# Patient Record
Sex: MALE | Race: OTHER | HISPANIC OR LATINO | URBAN - METROPOLITAN AREA
[De-identification: names, ages, dates, MRNs, and addresses within clinical notes are randomized per-mention and may not be internally consistent; named-entity substitution may affect disease eponyms.]

---

## 2023-04-19 ENCOUNTER — INPATIENT (INPATIENT)
Facility: HOSPITAL | Age: 33
LOS: 3 days | Discharge: HOME | DRG: 373 | End: 2023-04-23
Attending: COLON & RECTAL SURGERY | Admitting: COLON & RECTAL SURGERY
Payer: COMMERCIAL

## 2023-04-19 VITALS
OXYGEN SATURATION: 98 % | TEMPERATURE: 99 F | WEIGHT: 199.96 LBS | HEART RATE: 106 BPM | HEIGHT: 68 IN | DIASTOLIC BLOOD PRESSURE: 81 MMHG | RESPIRATION RATE: 18 BRPM | SYSTOLIC BLOOD PRESSURE: 126 MMHG

## 2023-04-19 LAB
ALBUMIN SERPL ELPH-MCNC: 4.2 G/DL — SIGNIFICANT CHANGE UP (ref 3.3–5)
ALP SERPL-CCNC: 84 U/L — SIGNIFICANT CHANGE UP (ref 40–120)
ALT FLD-CCNC: 129 U/L — HIGH (ref 10–45)
ANION GAP SERPL CALC-SCNC: 12 MMOL/L — SIGNIFICANT CHANGE UP (ref 5–17)
APPEARANCE UR: CLEAR — SIGNIFICANT CHANGE UP
APTT BLD: 36.6 SEC — HIGH (ref 27.5–35.5)
AST SERPL-CCNC: 35 U/L — SIGNIFICANT CHANGE UP (ref 10–40)
BILIRUB SERPL-MCNC: 1.3 MG/DL — HIGH (ref 0.2–1.2)
BILIRUB UR-MCNC: NEGATIVE — SIGNIFICANT CHANGE UP
BLD GP AB SCN SERPL QL: NEGATIVE — SIGNIFICANT CHANGE UP
BUN SERPL-MCNC: 20 MG/DL — SIGNIFICANT CHANGE UP (ref 7–23)
CALCIUM SERPL-MCNC: 9.9 MG/DL — SIGNIFICANT CHANGE UP (ref 8.4–10.5)
CHLORIDE SERPL-SCNC: 100 MMOL/L — SIGNIFICANT CHANGE UP (ref 96–108)
CO2 SERPL-SCNC: 26 MMOL/L — SIGNIFICANT CHANGE UP (ref 22–31)
COLOR SPEC: YELLOW — SIGNIFICANT CHANGE UP
CREAT SERPL-MCNC: 0.99 MG/DL — SIGNIFICANT CHANGE UP (ref 0.5–1.3)
DIFF PNL FLD: NEGATIVE — SIGNIFICANT CHANGE UP
EGFR: 103 ML/MIN/1.73M2 — SIGNIFICANT CHANGE UP
GLUCOSE SERPL-MCNC: 93 MG/DL — SIGNIFICANT CHANGE UP (ref 70–99)
GLUCOSE UR QL: NEGATIVE — SIGNIFICANT CHANGE UP
HCT VFR BLD CALC: 43.3 % — SIGNIFICANT CHANGE UP (ref 39–50)
HGB BLD-MCNC: 15 G/DL — SIGNIFICANT CHANGE UP (ref 13–17)
INR BLD: 1.13 — SIGNIFICANT CHANGE UP (ref 0.88–1.16)
KETONES UR-MCNC: NEGATIVE — SIGNIFICANT CHANGE UP
LEUKOCYTE ESTERASE UR-ACNC: NEGATIVE — SIGNIFICANT CHANGE UP
MCHC RBC-ENTMCNC: 30.5 PG — SIGNIFICANT CHANGE UP (ref 27–34)
MCHC RBC-ENTMCNC: 34.6 GM/DL — SIGNIFICANT CHANGE UP (ref 32–36)
MCV RBC AUTO: 88.2 FL — SIGNIFICANT CHANGE UP (ref 80–100)
NITRITE UR-MCNC: NEGATIVE — SIGNIFICANT CHANGE UP
NRBC # BLD: 0 /100 WBCS — SIGNIFICANT CHANGE UP (ref 0–0)
PH UR: 6.5 — SIGNIFICANT CHANGE UP (ref 5–8)
PLATELET # BLD AUTO: 365 K/UL — SIGNIFICANT CHANGE UP (ref 150–400)
POTASSIUM SERPL-MCNC: 4.2 MMOL/L — SIGNIFICANT CHANGE UP (ref 3.5–5.3)
POTASSIUM SERPL-SCNC: 4.2 MMOL/L — SIGNIFICANT CHANGE UP (ref 3.5–5.3)
PROT SERPL-MCNC: 7.5 G/DL — SIGNIFICANT CHANGE UP (ref 6–8.3)
PROT UR-MCNC: NEGATIVE MG/DL — SIGNIFICANT CHANGE UP
PROTHROM AB SERPL-ACNC: 13.5 SEC — HIGH (ref 10.5–13.4)
RBC # BLD: 4.91 M/UL — SIGNIFICANT CHANGE UP (ref 4.2–5.8)
RBC # FLD: 12.5 % — SIGNIFICANT CHANGE UP (ref 10.3–14.5)
RH IG SCN BLD-IMP: POSITIVE — SIGNIFICANT CHANGE UP
SODIUM SERPL-SCNC: 138 MMOL/L — SIGNIFICANT CHANGE UP (ref 135–145)
SP GR SPEC: 1.02 — SIGNIFICANT CHANGE UP (ref 1–1.03)
UROBILINOGEN FLD QL: 1 E.U./DL — SIGNIFICANT CHANGE UP
WBC # BLD: 9.97 K/UL — SIGNIFICANT CHANGE UP (ref 3.8–10.5)
WBC # FLD AUTO: 9.97 K/UL — SIGNIFICANT CHANGE UP (ref 3.8–10.5)

## 2023-04-19 PROCEDURE — 74177 CT ABD & PELVIS W/CONTRAST: CPT | Mod: 26,MG

## 2023-04-19 PROCEDURE — G1004: CPT

## 2023-04-19 PROCEDURE — 99285 EMERGENCY DEPT VISIT HI MDM: CPT

## 2023-04-19 RX ORDER — SODIUM CHLORIDE 9 MG/ML
1000 INJECTION INTRAMUSCULAR; INTRAVENOUS; SUBCUTANEOUS ONCE
Refills: 0 | Status: COMPLETED | OUTPATIENT
Start: 2023-04-19 | End: 2023-04-19

## 2023-04-19 RX ORDER — CEFTRIAXONE 500 MG/1
1000 INJECTION, POWDER, FOR SOLUTION INTRAMUSCULAR; INTRAVENOUS EVERY 24 HOURS
Refills: 0 | Status: DISCONTINUED | OUTPATIENT
Start: 2023-04-19 | End: 2023-04-23

## 2023-04-19 RX ORDER — METRONIDAZOLE 500 MG
500 TABLET ORAL EVERY 8 HOURS
Refills: 0 | Status: DISCONTINUED | OUTPATIENT
Start: 2023-04-20 | End: 2023-04-23

## 2023-04-19 RX ORDER — MORPHINE SULFATE 50 MG/1
4 CAPSULE, EXTENDED RELEASE ORAL ONCE
Refills: 0 | Status: DISCONTINUED | OUTPATIENT
Start: 2023-04-19 | End: 2023-04-19

## 2023-04-19 RX ORDER — CEFTRIAXONE 500 MG/1
1000 INJECTION, POWDER, FOR SOLUTION INTRAMUSCULAR; INTRAVENOUS ONCE
Refills: 0 | Status: COMPLETED | OUTPATIENT
Start: 2023-04-19 | End: 2023-04-19

## 2023-04-19 RX ORDER — HYDROMORPHONE HYDROCHLORIDE 2 MG/ML
0.25 INJECTION INTRAMUSCULAR; INTRAVENOUS; SUBCUTANEOUS ONCE
Refills: 0 | Status: DISCONTINUED | OUTPATIENT
Start: 2023-04-19 | End: 2023-04-20

## 2023-04-19 RX ORDER — SODIUM CHLORIDE 9 MG/ML
1000 INJECTION, SOLUTION INTRAVENOUS
Refills: 0 | Status: DISCONTINUED | OUTPATIENT
Start: 2023-04-19 | End: 2023-04-22

## 2023-04-19 RX ORDER — DIATRIZOATE MEGLUMINE 180 MG/ML
30 INJECTION, SOLUTION INTRAVESICAL ONCE
Refills: 0 | Status: COMPLETED | OUTPATIENT
Start: 2023-04-19 | End: 2023-04-19

## 2023-04-19 RX ORDER — METRONIDAZOLE 500 MG
500 TABLET ORAL ONCE
Refills: 0 | Status: COMPLETED | OUTPATIENT
Start: 2023-04-19 | End: 2023-04-19

## 2023-04-19 RX ORDER — ACETAMINOPHEN 500 MG
1000 TABLET ORAL ONCE
Refills: 0 | Status: DISCONTINUED | OUTPATIENT
Start: 2023-04-19 | End: 2023-04-20

## 2023-04-19 RX ADMIN — CEFTRIAXONE 1000 MILLIGRAM(S): 500 INJECTION, POWDER, FOR SOLUTION INTRAMUSCULAR; INTRAVENOUS at 23:12

## 2023-04-19 RX ADMIN — CEFTRIAXONE 100 MILLIGRAM(S): 500 INJECTION, POWDER, FOR SOLUTION INTRAMUSCULAR; INTRAVENOUS at 22:25

## 2023-04-19 RX ADMIN — MORPHINE SULFATE 4 MILLIGRAM(S): 50 CAPSULE, EXTENDED RELEASE ORAL at 21:32

## 2023-04-19 RX ADMIN — MORPHINE SULFATE 4 MILLIGRAM(S): 50 CAPSULE, EXTENDED RELEASE ORAL at 22:27

## 2023-04-19 RX ADMIN — DIATRIZOATE MEGLUMINE 30 MILLILITER(S): 180 INJECTION, SOLUTION INTRAVESICAL at 18:46

## 2023-04-19 RX ADMIN — SODIUM CHLORIDE 1000 MILLILITER(S): 9 INJECTION INTRAMUSCULAR; INTRAVENOUS; SUBCUTANEOUS at 18:46

## 2023-04-19 RX ADMIN — Medication 100 MILLIGRAM(S): at 22:25

## 2023-04-19 NOTE — ED ADULT NURSE NOTE - OBJECTIVE STATEMENT
Presents for c/o RLQ pain with n+v with yellow vomitus x 1 day, advised to come to ED by PCP for likely pre-op. Denies CP/SOB/weakness/dizziness/tingling/cough/fevers/known sick contacts.    On assessment- AOx4, breathing even and unlabored on RA, no apparent distress at rest, VSS in triage, able to speak in clear coherent sentences, steady gait unassisted, neuro intact with no apparent facial asymmetry, PERRLA. Tenderness RLQ to palpation.

## 2023-04-19 NOTE — H&P ADULT - NSHPLABSRESULTS_GEN_ALL_CORE
LABS:                        15.0   9.97  )-----------( 365      ( 19 Apr 2023 19:04 )             43.3     04-19    138  |  100  |  20  ----------------------------<  93  4.2   |  26  |  0.99    Ca    9.9      19 Apr 2023 19:04    TPro  7.5  /  Alb  4.2  /  TBili  1.3<H>  /  DBili  x   /  AST  35  /  ALT  129<H>  /  AlkPhos  84  04-19    PT/INR - ( 19 Apr 2023 19:04 )   PT: 13.5 sec;   INR: 1.13          PTT - ( 19 Apr 2023 19:04 )  PTT:36.6 sec  LIVER FUNCTIONS - ( 19 Apr 2023 19:04 )  Alb: 4.2 g/dL / Pro: 7.5 g/dL / ALK PHOS: 84 U/L / ALT: 129 U/L / AST: 35 U/L / GGT: x             RADIOLOGY & ADDITIONAL STUDIES:  CT Abdomen and Pelvis w/ Oral Cont and w/ IV Cont:   ******PRELIMINARY REPORT******    ******PRELIMINARY REPORT******       ACC: [id number] EXAM:  CT ABDOMEN AND PELVIS OC IC   ORDERED BY: PHI YUEN     PROCEDURE DATE:  04/19/2023    ******PRELIMINARY REPORT******    ******PRELIMINARY REPORT******       INTERPRETATION:  [facility] RADIOLOGIST PRELIMINARY REPORT      Initial report created on 4/19/2023 10:01:35 PM EDT  PROCEDURE INFORMATION:  Exam: CT Abdomen And Pelvis With Contrast  Exam date and time: 4/19/2023 8:40 PM  Age: 32 years old  Clinical indication: Rlq pain, concerned for appendicitis    TECHNIQUE:  Imaging protocol: Computed tomography of the abdomen and pelvis with   contrast.  Contrast material: IV: ISOVUE 370; ORAL: OMNIPAQUE 300; Contrast volume:   90 ml;  Contrast route: IV;    COMPARISON:  No relevant prior studies available.    FINDINGS:  Lungs: Lung bases are clear.  Liver: Normal liver.  Gallbladder and bile ducts: Normal gallbladder. No calcified stones. No ductal dilation.  Pancreas: Normal pancreas. No ductal dilation.  Spleen: Normal spleen. No splenomegaly.  Adrenal glands: Adrenal glands are normal.  Kidneys and ureters: Normal kidneys. No hydronephrosis. No calculus.  Stomach and bowel: Remaining bowel loops appear unremarkable.  Appendix: Normal appendix is not seen. A 6 x 6 x 6 cm abscess abutting the cecum.  Intraperitoneal space: Small amount of free fluid in the pelvis. No free air.  Vasculature: Unremarkable. No abdominal aortic aneurysm.  Lymph nodes: No adenopathy.  Urinary bladder: Normal urinary bladder.  Reproductive: Normal prostate gland.  Bones/joints: Unremarkable. No acute fracture.  Soft tissues: Unremarkable.    IMPRESSION:  1.   A 6 x 6 x 6 cm abscess abutting the cecum, with normal appendix not seen. Suspect a perforated appendicitis.  2.   Small amount of free fluid in the pelvis.      ******PRELIMINARY REPORT******    ******PRELIMINARY REPORT******       GUSTABO ACOSTA M.D.;[facility] RADIOLOGIST  This document is a PRELIMINARY interpretation and is pending final   attending approval. Apr 19 2023 10:02PM (04-19-23 @ 20:46)

## 2023-04-19 NOTE — ED PROVIDER NOTE - PHYSICAL EXAMINATION
VITAL SIGNS: I have reviewed nursing notes and confirm.  CONSTITUTIONAL: Well-developed; well-nourished; in no acute distress.   SKIN:  warm and dry, no acute rash.   HEAD:  normocephalic, atraumatic.  EYES: PERRL, EOM intact; conjunctiva and sclera clear.  ENT: No nasal discharge; airway clear.   NECK: Supple; non tender.  CARD: S1, S2 normal; no murmurs, gallops, or rubs. Regular rate and rhythm.   RESP:  Clear to auscultation b/l, no wheezes, rales or rhonchi.  ABD: Normal bowel sounds; soft; non-distended; RLQ tenderness; no guarding/ rebound.  EXT: Normal ROM. No clubbing, cyanosis or edema. 2+ pulses to b/l ue/le.  NEURO: Alert, oriented, grossly unremarkable  PSYCH: Cooperative, mood and affect appropriate.

## 2023-04-19 NOTE — H&P ADULT - NSHPPHYSICALEXAM_GEN_ALL_CORE
T(C): 36.8 (04-19-23 @ 21:32), Max: 37.4 (04-19-23 @ 17:06)  HR: 93 (04-19-23 @ 21:32) (93 - 106)  BP: 119/78 (04-19-23 @ 21:32) (119/78 - 126/81)  RR: 17 (04-19-23 @ 21:32) (17 - 18)  SpO2: 98% (04-19-23 @ 21:32) (98% - 98%)    Physical Exam  General: NAD, laying comfortably in bed  Cardio: S1,S2, No MRG, RRR  Pulm: No respiratory distress, breathing comfortably on room air  Abdomen: soft, mild RLQ TTP, nondistended  Extremities: WWP, peripheral pulses appreciated  Neuro: CNII-XII grossly intact, no gross motor deficits  Psych: AAOx3, pleasant

## 2023-04-19 NOTE — ED PROVIDER NOTE - NS ED ATTENDING STATEMENT MOD
This was a shared visit with the LAQUITA. I reviewed and verified the documentation and independently performed the documented:

## 2023-04-19 NOTE — ED PROVIDER NOTE - CLINICAL SUMMARY MEDICAL DECISION MAKING FREE TEXT BOX
Pt afebrile and hemodynamically stable. Sent today due to concern for perforated appendicitis. Given 1L NS, initially declined pain medication. CBC, CMP unremarkable. On reevaluation, pain has increased so given morphine 4mg IV. CTAP notable for s 6 x 6 x 6 cm abscess abutting the cecum, with normal appendix not seen. Suspect a perforated appendicitis. Small amount of free fluid in the pelvis. Surgery notified and will admit pt to Dr. Flores for further management. Pt ordered for ceftriaxone and flagyl. Last po intake 1pm today.

## 2023-04-19 NOTE — H&P ADULT - HISTORY OF PRESENT ILLNESS
HPI: 32M w/ no PMHx or PSHx presents for 3 days of RLQ abd pain. Pt states his pain began after lunch 3 days ago that he describes as sharp pain, worse with movement. He states his pain has not been resolving and he went to Dr. Flores's office today for evaluation and was sent in to the ED. Pt states he has been having a lot of burping over this time w/ decreased flatus. Endorses small yellow diarrhea. Minimal PO intake. Denies n/v. Denies f/c.    PAST MEDICAL & SURGICAL HISTORY:  None      In the ED, pt was afebrile, mildly tachycardic to 100s, normotensive, and satting well on RA. Labs significant for WBC . Hgb .    PMHx: Denies  PSHx: Denies  Social Hx: Social EtOH  Family Hx: Denies family hx of IBS, Crohn's or colon cancer. Mother has UC.  Last Cscope: Denies  Last EGD: Denies  All: No Known Allergies  Meds: [facility]    MEDICATIONS  (STANDING):  cefTRIAXone   IVPB 1000 milliGRAM(s) IV Intermittent every 24 hours  lactated ringers. 1000 milliLiter(s) (130 mL/Hr) IV Continuous <Continuous>    MEDICATIONS  (PRN):  acetaminophen   IVPB .. 1000 milliGRAM(s) IV Intermittent once PRN Temp greater or equal to 38C (100.4F), Mild Pain (1 - 3)  HYDROmorphone  Injectable 0.25 milliGRAM(s) IV Push once PRN Moderate Pain (4 - 6)

## 2023-04-19 NOTE — H&P ADULT - ASSESSMENT
32M w/ no PMHx or PSHx is admitted for perforated appendicitis.       PLAN  - Admit to [facility] in [facility]  - Pain/antiemetic management PRN  - NPO with maintenance IVFs  - DVT prophylaxis (SCDs only; HOLD SQH)  - Consult IR in AM for drainage   - Continue antibiotics (Ceftriaxone and Flagyl)  - Please page Surgery Team 2 at [phone] with any questions and/or clinical changes

## 2023-04-19 NOTE — ED ADULT NURSE NOTE - CAS TRG GEN SKIN CONDITION
Chart reviewed for care gaps. Patient is not been seen in office since 2019. [employer] message sent to schedule a Medicare wellness exam and fasting labs with PCP.
Warm

## 2023-04-20 LAB
ALBUMIN SERPL ELPH-MCNC: 3.9 G/DL — SIGNIFICANT CHANGE UP (ref 3.3–5)
ALP SERPL-CCNC: 91 U/L — SIGNIFICANT CHANGE UP (ref 40–120)
ALT FLD-CCNC: 86 U/L — HIGH (ref 10–45)
ANION GAP SERPL CALC-SCNC: 10 MMOL/L — SIGNIFICANT CHANGE UP (ref 5–17)
ANION GAP SERPL CALC-SCNC: 12 MMOL/L — SIGNIFICANT CHANGE UP (ref 5–17)
APTT BLD: 29.5 SEC — SIGNIFICANT CHANGE UP (ref 27.5–35.5)
APTT BLD: 33.2 SEC — SIGNIFICANT CHANGE UP (ref 27.5–35.5)
AST SERPL-CCNC: 26 U/L — SIGNIFICANT CHANGE UP (ref 10–40)
BILIRUB SERPL-MCNC: 2.4 MG/DL — HIGH (ref 0.2–1.2)
BLD GP AB SCN SERPL QL: NEGATIVE — SIGNIFICANT CHANGE UP
BUN SERPL-MCNC: 12 MG/DL — SIGNIFICANT CHANGE UP (ref 7–23)
BUN SERPL-MCNC: 14 MG/DL — SIGNIFICANT CHANGE UP (ref 7–23)
CALCIUM SERPL-MCNC: 9.4 MG/DL — SIGNIFICANT CHANGE UP (ref 8.4–10.5)
CALCIUM SERPL-MCNC: 9.8 MG/DL — SIGNIFICANT CHANGE UP (ref 8.4–10.5)
CHLORIDE SERPL-SCNC: 100 MMOL/L — SIGNIFICANT CHANGE UP (ref 96–108)
CHLORIDE SERPL-SCNC: 104 MMOL/L — SIGNIFICANT CHANGE UP (ref 96–108)
CO2 SERPL-SCNC: 24 MMOL/L — SIGNIFICANT CHANGE UP (ref 22–31)
CO2 SERPL-SCNC: 25 MMOL/L — SIGNIFICANT CHANGE UP (ref 22–31)
CREAT SERPL-MCNC: 0.81 MG/DL — SIGNIFICANT CHANGE UP (ref 0.5–1.3)
CREAT SERPL-MCNC: 0.85 MG/DL — SIGNIFICANT CHANGE UP (ref 0.5–1.3)
EGFR: 118 ML/MIN/1.73M2 — SIGNIFICANT CHANGE UP
EGFR: 119 ML/MIN/1.73M2 — SIGNIFICANT CHANGE UP
GLUCOSE BLDC GLUCOMTR-MCNC: 94 MG/DL — SIGNIFICANT CHANGE UP (ref 70–99)
GLUCOSE SERPL-MCNC: 102 MG/DL — HIGH (ref 70–99)
GLUCOSE SERPL-MCNC: 116 MG/DL — HIGH (ref 70–99)
GRAM STN FLD: SIGNIFICANT CHANGE UP
HCT VFR BLD CALC: 40 % — SIGNIFICANT CHANGE UP (ref 39–50)
HCT VFR BLD CALC: 42 % — SIGNIFICANT CHANGE UP (ref 39–50)
HGB BLD-MCNC: 13.7 G/DL — SIGNIFICANT CHANGE UP (ref 13–17)
HGB BLD-MCNC: 14.6 G/DL — SIGNIFICANT CHANGE UP (ref 13–17)
INR BLD: 1.2 — HIGH (ref 0.88–1.16)
INR BLD: 1.26 — HIGH (ref 0.88–1.16)
LACTATE SERPL-SCNC: 1.5 MMOL/L — SIGNIFICANT CHANGE UP (ref 0.5–2)
MAGNESIUM SERPL-MCNC: 2 MG/DL — SIGNIFICANT CHANGE UP (ref 1.6–2.6)
MAGNESIUM SERPL-MCNC: 2 MG/DL — SIGNIFICANT CHANGE UP (ref 1.6–2.6)
MCHC RBC-ENTMCNC: 30.4 PG — SIGNIFICANT CHANGE UP (ref 27–34)
MCHC RBC-ENTMCNC: 30.4 PG — SIGNIFICANT CHANGE UP (ref 27–34)
MCHC RBC-ENTMCNC: 34.3 GM/DL — SIGNIFICANT CHANGE UP (ref 32–36)
MCHC RBC-ENTMCNC: 34.8 GM/DL — SIGNIFICANT CHANGE UP (ref 32–36)
MCV RBC AUTO: 87.5 FL — SIGNIFICANT CHANGE UP (ref 80–100)
MCV RBC AUTO: 88.7 FL — SIGNIFICANT CHANGE UP (ref 80–100)
NRBC # BLD: 0 /100 WBCS — SIGNIFICANT CHANGE UP (ref 0–0)
NRBC # BLD: 0 /100 WBCS — SIGNIFICANT CHANGE UP (ref 0–0)
PHOSPHATE SERPL-MCNC: 3 MG/DL — SIGNIFICANT CHANGE UP (ref 2.5–4.5)
PHOSPHATE SERPL-MCNC: 3.6 MG/DL — SIGNIFICANT CHANGE UP (ref 2.5–4.5)
PLATELET # BLD AUTO: 323 K/UL — SIGNIFICANT CHANGE UP (ref 150–400)
PLATELET # BLD AUTO: 334 K/UL — SIGNIFICANT CHANGE UP (ref 150–400)
POTASSIUM SERPL-MCNC: 4.2 MMOL/L — SIGNIFICANT CHANGE UP (ref 3.5–5.3)
POTASSIUM SERPL-MCNC: 4.3 MMOL/L — SIGNIFICANT CHANGE UP (ref 3.5–5.3)
POTASSIUM SERPL-SCNC: 4.2 MMOL/L — SIGNIFICANT CHANGE UP (ref 3.5–5.3)
POTASSIUM SERPL-SCNC: 4.3 MMOL/L — SIGNIFICANT CHANGE UP (ref 3.5–5.3)
PROT SERPL-MCNC: 7.3 G/DL — SIGNIFICANT CHANGE UP (ref 6–8.3)
PROTHROM AB SERPL-ACNC: 14.3 SEC — HIGH (ref 10.5–13.4)
PROTHROM AB SERPL-ACNC: 15 SEC — HIGH (ref 10.5–13.4)
RBC # BLD: 4.51 M/UL — SIGNIFICANT CHANGE UP (ref 4.2–5.8)
RBC # BLD: 4.8 M/UL — SIGNIFICANT CHANGE UP (ref 4.2–5.8)
RBC # FLD: 12.2 % — SIGNIFICANT CHANGE UP (ref 10.3–14.5)
RBC # FLD: 12.5 % — SIGNIFICANT CHANGE UP (ref 10.3–14.5)
RH IG SCN BLD-IMP: POSITIVE — SIGNIFICANT CHANGE UP
SODIUM SERPL-SCNC: 137 MMOL/L — SIGNIFICANT CHANGE UP (ref 135–145)
SODIUM SERPL-SCNC: 138 MMOL/L — SIGNIFICANT CHANGE UP (ref 135–145)
SPECIMEN SOURCE: SIGNIFICANT CHANGE UP
WBC # BLD: 11.64 K/UL — HIGH (ref 3.8–10.5)
WBC # BLD: 9.41 K/UL — SIGNIFICANT CHANGE UP (ref 3.8–10.5)
WBC # FLD AUTO: 11.64 K/UL — HIGH (ref 3.8–10.5)
WBC # FLD AUTO: 9.41 K/UL — SIGNIFICANT CHANGE UP (ref 3.8–10.5)

## 2023-04-20 PROCEDURE — 49406 IMAGE CATH FLUID PERI/RETRO: CPT

## 2023-04-20 RX ORDER — ACETAMINOPHEN 500 MG
1000 TABLET ORAL ONCE
Refills: 0 | Status: COMPLETED | OUTPATIENT
Start: 2023-04-20 | End: 2023-04-20

## 2023-04-20 RX ORDER — HEPARIN SODIUM 5000 [USP'U]/ML
5000 INJECTION INTRAVENOUS; SUBCUTANEOUS EVERY 8 HOURS
Refills: 0 | Status: DISCONTINUED | OUTPATIENT
Start: 2023-04-20 | End: 2023-04-23

## 2023-04-20 RX ORDER — SODIUM CHLORIDE 9 MG/ML
1000 INJECTION, SOLUTION INTRAVENOUS ONCE
Refills: 0 | Status: COMPLETED | OUTPATIENT
Start: 2023-04-20 | End: 2023-04-20

## 2023-04-20 RX ADMIN — SODIUM CHLORIDE 1000 MILLILITER(S): 9 INJECTION, SOLUTION INTRAVENOUS at 22:39

## 2023-04-20 RX ADMIN — Medication 100 MILLIGRAM(S): at 14:36

## 2023-04-20 RX ADMIN — Medication 400 MILLIGRAM(S): at 01:12

## 2023-04-20 RX ADMIN — CEFTRIAXONE 100 MILLIGRAM(S): 500 INJECTION, POWDER, FOR SOLUTION INTRAMUSCULAR; INTRAVENOUS at 08:41

## 2023-04-20 RX ADMIN — SODIUM CHLORIDE 130 MILLILITER(S): 9 INJECTION, SOLUTION INTRAVENOUS at 00:46

## 2023-04-20 RX ADMIN — Medication 100 MILLIGRAM(S): at 23:26

## 2023-04-20 RX ADMIN — HEPARIN SODIUM 5000 UNIT(S): 5000 INJECTION INTRAVENOUS; SUBCUTANEOUS at 23:27

## 2023-04-20 RX ADMIN — Medication 1000 MILLIGRAM(S): at 01:58

## 2023-04-20 RX ADMIN — Medication 100 MILLIGRAM(S): at 06:04

## 2023-04-20 RX ADMIN — SODIUM CHLORIDE 130 MILLILITER(S): 9 INJECTION, SOLUTION INTRAVENOUS at 08:41

## 2023-04-20 NOTE — PATIENT PROFILE ADULT - FALL HARM RISK - UNIVERSAL INTERVENTIONS
Bed in lowest position, wheels locked, appropriate side rails in place/Call bell, personal items and telephone in reach/Instruct patient to call for assistance before getting out of bed or chair/Non-slip footwear when patient is out of bed/[place] to call system/Physically safe environment - no spills, clutter or unnecessary equipment/Purposeful Proactive Rounding/Room/bathroom lighting operational, light cord in reach

## 2023-04-20 NOTE — CHART NOTE - NSCHARTNOTEFT_GEN_A_CORE
SERIAL ABDOMINAL EXAM    SUBJECTIVE  Pt seen an examined at bedside. Patient is lying in bed comfortably, pain well controlled. He denies nausea, vomiting, fever, and chills. Voiding without issue. Passing gas, no bowel movement. NPO for IR today. Informed that he will be called soon to go down. A bit anxious about procedure and answered his questions.     T(C): 36.9 (04-20-23 @ 08:50), Max: 38.2 (04-20-23 @ 01:00)  HR: 87 (04-20-23 @ 08:50) (85 - 112)  BP: 115/73 (04-20-23 @ 08:50) (108/67 - 132/82)  RR: 18 (04-20-23 @ 08:50) (17 - 19)  SpO2: 96% (04-20-23 @ 08:50) (94% - 98%)    OBJECTIVE    PHYSICAL EXAM  General: Patient is doing well and lying in bed comfortably  Constitutional: Alert and Oriented  Pulm: Nonlabored breathing, no respiratory distress  CV: Regular rate and rhythm  Abd: soft, RLQ tenderness, mild distension. No guarding or rebound  Extremities: warm, well perfused, no edema    ASSESSMENT/PLAN  32M w/ no PMHx or PSHx admitted for perforated appendicitis w. abscess and small fluid in pelvis. Will be going for IR abscess drainage today.     IR   NPO/IVF  ANDRES   CTX/FLG  pain/nausea prn   OOB/IS/SCDs

## 2023-04-20 NOTE — CHART NOTE - NSCHARTNOTEFT_GEN_A_CORE
Serial Abdominal Exam @ 0100     S: Pt seen and examined at bedside. Complains of right lower quadrant abdominal pain. Denies nausea or vomiting.     O:  T(C): 38.2 (04-20-23 @ 01:00), Max: 38.2 (04-20-23 @ 01:00)  T(F): 100.8 (04-20-23 @ 01:00), Max: 100.8 (04-20-23 @ 01:00)  HR: 112 (04-20-23 @ 01:00) (112 - 112)  BP: 132/82 (04-20-23 @ 01:00) (132/82 - 132/82)  RR: 19 (04-20-23 @ 01:00) (19 - 19)  SpO2: 96% (04-20-23 @ 01:00) (96% - 96%)  Wt(kg): --                        15.0   9.97  )-----------( 365      ( 19 Apr 2023 19:04 )             43.3     04-19    138  |  100  |  20  ----------------------------<  93  4.2   |  26  |  0.99    Ca    9.9      19 Apr 2023 19:04    TPro  7.5  /  Alb  4.2  /  TBili  1.3<H>  /  DBili  x   /  AST  35  /  ALT  129<H>  /  AlkPhos  84  04-19      Gen: NAD, resting comfortably in bed  C/V: pulses present and strong in the b/l upper extremities   Pulm: Nonlabored breathing, no respiratory distress  Abd: soft, nondistended, tender to palpation of the RLQ, no rebound or guarding   Extrem: WWP, no calf edema, SCDs in place     Will continue to monitor.

## 2023-04-20 NOTE — CHART NOTE - NSCHARTNOTEFT_GEN_A_CORE
Serial Abdominal Exam @ 1900     S: Pt seen and examined at bedside. Reports feeling better after IR procedure. Reports having minimal gas and no BM. Reports feeling gas.     O:  T(C): 37.4 (04-20-23 @ 20:30), Max: 37.4 (04-20-23 @ 20:30)  T(F): 99.3 (04-20-23 @ 20:30), Max: 99.3 (04-20-23 @ 20:30)  HR: 94 (04-20-23 @ 20:30) (94 - 94)  BP: 116/70 (04-20-23 @ 20:30) (116/70 - 116/70)  RR: 18 (04-20-23 @ 20:30) (18 - 18)  SpO2: 98% (04-20-23 @ 20:30) (98% - 98%)  Wt(kg): --                        13.7   9.41  )-----------( 323      ( 20 Apr 2023 05:30 )             40.0     04-20    138  |  104  |  14  ----------------------------<  102<H>  4.2   |  24  |  0.81    Ca    9.4      20 Apr 2023 05:30  Phos  3.6     04-20  Mg     2.0     04-20    TPro  7.5  /  Alb  4.2  /  TBili  1.3<H>  /  DBili  x   /  AST  35  /  ALT  129<H>  /  AlkPhos  84  04-19      Gen: NAD, resting comfortably in bed  C/V: pulses present and strong in the b/l upper extremities   Pulm: Nonlabored breathing, no respiratory distress  Abd: soft, mildy distended, tender to palpation of the RLQ, no rebound or guarding   Extrem: WWP, no calf edema, SCDs in place     Will continue to monitor.

## 2023-04-20 NOTE — CHART NOTE - NSCHARTNOTEFT_GEN_A_CORE
***Rapid Response Clinical Impact Physician Assistant Note***    31 y/o Male w/ no PMHx or PSHx who initially presented referred from outpatient MD's office for evaluation of RLQ abd pain x3 days. Pt now POD #0 from abd abbess drainage w/ Reji drain left in place.    Rapid response team called for witnessed syncope. Patient was seen and examined at the bedside by the rapid response team. On arrival to unit pt was conscious and A+Ox3 w/ situational awareness, laying supine on the ground in the hallway in care of primary team. Per primary team, pt was ambulating in the hallway for approx 10min in care of PCA when he began having generalized abd discomfort described as gas w/ associated belching, at which point he c/o feeling dizzy/lightheaded and was assisted first to a chair and then to the ground by staff members w/o sustaining any trauma. Witnesses report the pt's eyes rolled back as he was being assisted to the chair then ground, and was not responsive but appeared to be conscious for <1min before prompt return to baseline MS. Only identifiable prodrome was belching/gas, and no postictal s/sx reported, and pt w/o neurological deficits. VS: T 98.6F, HR 76bpm regular, /72, RR 20 non-labored, SpO2 98% on RA; FS BGL 94. EKG unremarkable, NSR w/o ischemic changes. Stat labs were obtained and pt was recommended to go for abdominal imaging to r/o potential surgical complications. Pt subsequently transferred to surgical tele for further monitoring.    Allergies  No Known Allergies or Intolerances      PAST MEDICAL & SURGICAL HISTORY:  No pertinent past medical history  No significant past surgical history      Vital Signs Last 24 Hrs  T(C): 37.4 (2023 20:30), Max: 38.2 (2023 01:00)  T(F): 99.3 (2023 20:30), Max: 100.8 (2023 01:00)  HR: 94 (2023 20:30) (85 - 112)  BP: 116/70 (2023 20:30) (108/67 - 132/82)  BP(mean): 80 (2023 05:00) (80 - 99)  RR: 18 (2023 20:30) (18 - 19)  SpO2: 98% (2023 20:30) (94% - 98%)    Parameters below as of 2023 20:30  Patient On (Oxygen Delivery Method): room air    ROS:  Physical exam:  GENERAL: The patient is awake and alert in no apparent distress.   HEENT: Head is normocephalic and atraumatic. Extraocular muscles are intact. Mucous membranes are moist. No throat erythema/exudates no lymphadenopathy, no JVD,   NECK: Supple.  LUNGS: Clear to auscultation BL without wheezing, rales or rhonchi; respirations unlabored  HEART: Regular rate and rhythm ,+S1/+S2, no murmurs, rubs, gallops  ABDOMEN: Soft, nontender, no rebound, guarding rigidity, bowel sounds in all 4 quadrants. Abd mildly distended w/o fluid wave. Reji drain placed in RLQ of abdoment draining serosanguinous fluid to bulb suction; no evidence of erythema/bleeding/discharge around insertion site.  EXTREMITIES: Without any cyanosis, clubbing, rash, lesions or edema.  SKIN: No new rashes or lesions.  MSK: strength equal BL  VASCULAR: Radial and Dorsal pedal pulses palpable BL.  NEUROLOGIC: Grossly intact.  PSYCH: No new changes.     @ 07:  -   @ 07:00  --------------------------------------------------------  IN: 1240 mL / OUT: 0 mL / NET: 1240 mL     @ 07:  -   @ 22:54  --------------------------------------------------------  IN: 1540 mL / OUT: 1520 mL / NET: 20 mL                              14.6   11.64 )-----------( 334      ( 2023 22:40 )             42.0         138  |  104  |  14  ----------------------------<  102<H>  4.2   |  24  |  0.81    Ca    9.4      2023 05:30  Phos  3.6     04-20  Mg     2.0     04-20    TPro  7.5  /  Alb  4.2  /  TBili  1.3<H>  /  DBili  x   /  AST  35  /  ALT  129<H>  /  AlkPhos  84  04-19           LIVER FUNCTIONS - ( 2023 19:04 )  Alb: 4.2 g/dL / Pro: 7.5 g/dL / ALK PHOS: 84 U/L / ALT: 129 U/L / AST: 35 U/L / GGT: x         Urinalysis Basic - ( 2023 21:28 )    Color: Yellow / Appearance: Clear / S.020 / pH: x  Gluc: x / Ketone: NEGATIVE  / Bili: Negative / Urobili: 1.0 E.U./dL   Blood: x / Protein: NEGATIVE mg/dL / Nitrite: NEGATIVE   Leuk Esterase: NEGATIVE / RBC: x / WBC x   Sq Epi: x / Non Sq Epi: x / Bacteria: x         PT/INR - ( 2023 05:30 )   PT: 14.3 sec;   INR: 1.20          PTT - ( 2023 05:30 )  PTT:33.2 sec    MEDICATIONS  (STANDING):  cefTRIAXone   IVPB 1000 milliGRAM(s) IV Intermittent every 24 hours  heparin   Injectable 5000 Unit(s) SubCutaneous every 8 hours  lactated ringers Bolus 1000 milliLiter(s) IV Bolus once  lactated ringers. 1000 milliLiter(s) (130 mL/Hr) IV Continuous <Continuous>  metroNIDAZOLE  IVPB 500 milliGRAM(s) IV Intermittent every 8 hours      Assessment:  31 y/o Male w/ no PMHx or PSHx who initially presented referred from outpatient MD's office for evaluation of RLQ abd pain x3 days. Pt now POD #0 from abd abscess drainage w/ Reji drain left in place to bulb suction w/ serosanguinous drainage. Rapid response team called for witnessed syncope assisted to the ground w/o trauma. Syncope likely vasovagal in etiology. Pt subsequently transferred to surgical tele for further monitoring.    Differential diagnosis to include:  - Vasovagal syncope 2/2 gas/belching most likely given presentation, normotension, normal BGL/H&H and no significant findings  - Arrythmia possible but less likely given no cardiac hx and pt was ambulating for approx 10min w/o symptoms  - Seizure possible but less likely given no seizure hx, atypical prodrome of belching/gas, and no postictal period      Plan-  - Transfer to telemetry floor for further monitoring  - f/u STAT labs  - Recommend obtaining abd imaging to r/o post-op complications/pathology  - Falls precautions, OOB w/ assistant ***Rapid Response Clinical Impact Physician Assistant Note***    31 y/o Male w/ no PMHx or PSHx who initially presented referred from outpatient MD's office for evaluation of RLQ abd pain x3 days. Pt now POD #0 from abd abscess drainage w/ Reji drain left in place.    Rapid response team called for witnessed syncope. Patient was seen and examined at the bedside by the rapid response team. On arrival to unit pt was conscious and A+Ox3 w/ situational awareness, laying supine on the ground in the hallway in care of primary team. Per primary team, pt was ambulating in the hallway for approx 10min in care of PCA when he began having generalized abd discomfort described as gas w/ associated belching, at which point he c/o feeling dizzy/lightheaded and was assisted first to a chair and then to the ground by staff members w/o sustaining any trauma. Witnesses report the pt's eyes rolled back as he was being assisted to the chair then ground, and was not responsive but appeared to be conscious for <1min before prompt return to baseline MS. Only identifiable prodrome was belching/gas, and no postictal s/sx reported, and pt w/o neurological deficits. VS: T 98.6F, HR 76bpm regular, /72, RR 20 non-labored, SpO2 98% on RA; FS BGL 94. EKG unremarkable, NSR w/o ischemic changes. Stat labs were obtained and pt was recommended to go for abdominal imaging to r/o potential surgical complications. Pt subsequently transferred to surgical tele for further monitoring.    Allergies  No Known Allergies or Intolerances      PAST MEDICAL & SURGICAL HISTORY:  No pertinent past medical history  No significant past surgical history      Vital Signs Last 24 Hrs  T(C): 37.4 (2023 20:30), Max: 38.2 (2023 01:00)  T(F): 99.3 (2023 20:30), Max: 100.8 (2023 01:00)  HR: 94 (2023 20:30) (85 - 112)  BP: 116/70 (2023 20:30) (108/67 - 132/82)  BP(mean): 80 (2023 05:00) (80 - 99)  RR: 18 (2023 20:30) (18 - 19)  SpO2: 98% (2023 20:30) (94% - 98%)    Parameters below as of 2023 20:30  Patient On (Oxygen Delivery Method): room air    ROS:  REVIEW OF SYSTEMS:    CONSTITUTIONAL: No weakness, fevers or chills  EYES/ENT: No visual changes;  No vertigo or throat pain   NECK: No pain or stiffness  RESPIRATORY: No cough, wheezing, hemoptysis; No shortness of breath  CARDIOVASCULAR: No chest pain or palpitations  GASTROINTESTINAL: Mild epigastric discomfort on palpation. No nausea, vomiting, or hematemesis; No diarrhea or constipation. No melena or hematochezia.  GENITOURINARY: No dysuria, frequency or hematuria  NEUROLOGICAL: No numbness or weakness  SKIN: No itching, burning, rashes, or lesions   All other review of systems is negative unless indicated above.    Physical exam:  GENERAL: The patient is awake and alert in no apparent distress.   HEENT: Head is normocephalic and atraumatic. Extraocular muscles are intact. Mucous membranes are moist. No throat erythema/exudates no lymphadenopathy, no JVD,   NECK: Supple.  LUNGS: Clear to auscultation BL without wheezing, rales or rhonchi; respirations unlabored  HEART: Regular rate and rhythm ,+S1/+S2, no murmurs, rubs, gallops  ABDOMEN: Soft, nontender, no rebound, guarding rigidity, bowel sounds in all 4 quadrants. Abd mildly distended w/o fluid wave. Reji drain placed in RLQ of abdoment draining serosanguinous fluid to bulb suction; no evidence of erythema/bleeding/discharge around insertion site.  EXTREMITIES: Without any cyanosis, clubbing, rash, lesions or edema.  SKIN: No new rashes or lesions.  MSK: strength equal BL  VASCULAR: Radial and Dorsal pedal pulses palpable BL.  NEUROLOGIC: Grossly intact.  PSYCH: No new changes.     @ :  -   @ 07:00  --------------------------------------------------------  IN: 1240 mL / OUT: 0 mL / NET: 1240 mL     @ 07: @ 22:54  --------------------------------------------------------  IN: 1540 mL / OUT: 1520 mL / NET: 20 mL                              14.6   11.64 )-----------( 334      ( 2023 22:40 )             42.0         138  |  104  |  14  ----------------------------<  102<H>  4.2   |  24  |  0.81    Ca    9.4      2023 05:30  Phos  3.6       Mg     2.0         TPro  7.5  /  Alb  4.2  /  TBili  1.3<H>  /  DBili  x   /  AST  35  /  ALT  129<H>  /  AlkPhos  84  19           LIVER FUNCTIONS - ( 2023 19:04 )  Alb: 4.2 g/dL / Pro: 7.5 g/dL / ALK PHOS: 84 U/L / ALT: 129 U/L / AST: 35 U/L / GGT: x         Urinalysis Basic - ( 2023 21:28 )    Color: Yellow / Appearance: Clear / S.020 / pH: x  Gluc: x / Ketone: NEGATIVE  / Bili: Negative / Urobili: 1.0 E.U./dL   Blood: x / Protein: NEGATIVE mg/dL / Nitrite: NEGATIVE   Leuk Esterase: NEGATIVE / RBC: x / WBC x   Sq Epi: x / Non Sq Epi: x / Bacteria: x         PT/INR - ( 2023 05:30 )   PT: 14.3 sec;   INR: 1.20     PTT - ( 2023 05:30 )  PTT:33.2 sec    MEDICATIONS  (STANDING):  cefTRIAXone   IVPB 1000 milliGRAM(s) IV Intermittent every 24 hours  heparin   Injectable 5000 Unit(s) SubCutaneous every 8 hours  lactated ringers Bolus 1000 milliLiter(s) IV Bolus once  lactated ringers. 1000 milliLiter(s) (130 mL/Hr) IV Continuous <Continuous>  metroNIDAZOLE  IVPB 500 milliGRAM(s) IV Intermittent every 8 hours      Assessment:  31 y/o Male w/ no PMHx or PSHx who initially presented referred from outpatient MD's office for evaluation of RLQ abd pain x3 days. Pt now POD #0 from abd abscess drainage w/ Reji drain left in place to bulb suction w/ serosanguinous drainage. Rapid response team called for witnessed syncope assisted to the ground w/o trauma. Syncope likely vasovagal in etiology. Pt subsequently transferred to surgical tele for further monitoring.    Differential diagnosis to include:  - Vasovagal syncope 2/2 gas/belching most likely given presentation, normotension, normal BGL/H&H and no significant findings  - Arrythmia possible but less likely given no cardiac hx and pt was ambulating for approx 10min w/o symptoms  - Seizure possible but less likely given no seizure hx, atypical prodrome of belching/gas, and no postictal period      Plan-  - Transfer to telemetry floor for further monitoring  - f/u STAT labs  - Recommend obtaining abd imaging to r/o post-op complications/pathology  - Falls precautions, OOB w/ assistant ***Rapid Response Clinical Impact Physician Assistant Note***    31 y/o Male w/ no PMHx or PSHx who initially presented referred from outpatient MD's office for evaluation of RLQ abd pain x3 days. Pt now POD #0 from abd abscess drainage w/ Reji drain left in place.    Rapid response team called for witnessed syncope. Patient was seen and examined at the bedside by the rapid response team. On arrival to unit pt was conscious and A+Ox3 w/ situational awareness, laying supine on the ground in the hallway in care of primary team. Per primary team, pt was ambulating in the hallway for approx 10min in care of PCA when he began having generalized abd discomfort described as gas w/ associated belching, at which point he c/o feeling dizzy/lightheaded and was assisted first to a chair and then to the ground by staff members w/o sustaining any trauma. Witnesses report the pt's eyes rolled back as he was being assisted to the chair then ground, and was not responsive but appeared to be conscious for <1min before prompt return to baseline MS. Only identifiable prodrome was belching/gas, and no postictal s/sx reported, and pt w/o neurological deficits. VS: T 98.6F, HR 76bpm regular, /72, RR 20 non-labored, SpO2 98% on RA; FS BGL 94. EKG unremarkable, NSR w/o ischemic changes. Stat labs were obtained and pt was recommended to go for abdominal imaging to r/o potential surgical complications. Pt subsequently transferred to surgical tele for further monitoring.    Allergies  No Known Allergies or Intolerances      PAST MEDICAL & SURGICAL HISTORY:  No pertinent past medical history  No significant past surgical history      Vital Signs Last 24 Hrs  T(C): 37.4 (2023 20:30), Max: 38.2 (2023 01:00)  T(F): 99.3 (2023 20:30), Max: 100.8 (2023 01:00)  HR: 94 (2023 20:30) (85 - 112)  BP: 116/70 (2023 20:30) (108/67 - 132/82)  BP(mean): 80 (2023 05:00) (80 - 99)  RR: 18 (2023 20:30) (18 - 19)  SpO2: 98% (2023 20:30) (94% - 98%)    Parameters below as of 2023 20:30  Patient On (Oxygen Delivery Method): room air    ROS:  REVIEW OF SYSTEMS:    CONSTITUTIONAL: No weakness, fevers or chills  EYES/ENT: No visual changes;  No vertigo or throat pain   NECK: No pain or stiffness  RESPIRATORY: No cough, wheezing, hemoptysis; No shortness of breath  CARDIOVASCULAR: No chest pain or palpitations  GASTROINTESTINAL: Mild epigastric discomfort on palpation. No nausea, vomiting, or hematemesis; No diarrhea or constipation. No melena or hematochezia.  GENITOURINARY: No dysuria, frequency or hematuria  NEUROLOGICAL: No numbness or weakness  SKIN: No itching, burning, rashes, or lesions   All other review of systems is negative unless indicated above.    Physical exam:  GENERAL: The patient is awake and alert in no apparent distress.   HEENT: Head is normocephalic and atraumatic. Extraocular muscles are intact. Mucous membranes are moist. No throat erythema/exudates no lymphadenopathy, no JVD,   NECK: Supple.  LUNGS: Clear to auscultation BL without wheezing, rales or rhonchi; respirations unlabored  HEART: Regular rate and rhythm ,+S1/+S2, no murmurs, rubs, gallops  ABDOMEN: Soft, nontender, no rebound, guarding rigidity, bowel sounds in all 4 quadrants. Abd mildly distended w/o fluid wave. Reji drain placed in RLQ of abdoment draining serosanguinous fluid to bulb suction; no evidence of erythema/bleeding/discharge around insertion site.  EXTREMITIES: Without any cyanosis, clubbing, rash, lesions or edema.  SKIN: No new rashes or lesions.  MSK: strength equal BL  VASCULAR: Radial and Dorsal pedal pulses palpable BL.  NEUROLOGIC: Grossly intact.  PSYCH: No new changes.     @ :  -   @ 07:00  --------------------------------------------------------  IN: 1240 mL / OUT: 0 mL / NET: 1240 mL     @ 07: @ 22:54  --------------------------------------------------------  IN: 1540 mL / OUT: 1520 mL / NET: 20 mL                              14.6   11.64 )-----------( 334      ( 2023 22:40 )             42.0         138  |  104  |  14  ----------------------------<  102<H>  4.2   |  24  |  0.81    Ca    9.4      2023 05:30  Phos  3.6       Mg     2.0         TPro  7.5  /  Alb  4.2  /  TBili  1.3<H>  /  DBili  x   /  AST  35  /  ALT  129<H>  /  AlkPhos  84  19           LIVER FUNCTIONS - ( 2023 19:04 )  Alb: 4.2 g/dL / Pro: 7.5 g/dL / ALK PHOS: 84 U/L / ALT: 129 U/L / AST: 35 U/L / GGT: x         Urinalysis Basic - ( 2023 21:28 )    Color: Yellow / Appearance: Clear / S.020 / pH: x  Gluc: x / Ketone: NEGATIVE  / Bili: Negative / Urobili: 1.0 E.U./dL   Blood: x / Protein: NEGATIVE mg/dL / Nitrite: NEGATIVE   Leuk Esterase: NEGATIVE / RBC: x / WBC x   Sq Epi: x / Non Sq Epi: x / Bacteria: x         PT/INR - ( 2023 05:30 )   PT: 14.3 sec;   INR: 1.20     PTT - ( 2023 05:30 )  PTT:33.2 sec    MEDICATIONS  (STANDING):  cefTRIAXone   IVPB 1000 milliGRAM(s) IV Intermittent every 24 hours  heparin   Injectable 5000 Unit(s) SubCutaneous every 8 hours  lactated ringers Bolus 1000 milliLiter(s) IV Bolus once  lactated ringers. 1000 milliLiter(s) (130 mL/Hr) IV Continuous <Continuous>  metroNIDAZOLE  IVPB 500 milliGRAM(s) IV Intermittent every 8 hours      Assessment:  31 y/o Male w/ no PMHx or PSHx who initially presented referred from outpatient MD's office for evaluation of RLQ abd pain x3 days. Pt now POD #0 from abd abscess drainage w/ Reji drain left in place to bulb suction w/ serosanguinous drainage. Rapid response team called for witnessed syncope assisted to the ground w/o trauma. Syncope likely vasovagal in etiology. Pt subsequently transferred to surgical tele for further monitoring.    Differential diagnosis to include:  - Vasovagal syncope 2/2 gas/belching most likely given presentation, normotension, normal BGL/H&H and no significant findings  - Arrythmia possible but less likely given no cardiac hx and pt was ambulating for approx 10min w/o symptoms  - Seizure possible but less likely given no seizure hx, atypical prodrome of belching/gas, and no postictal period      Plan-  - Transfer to telemetry floor for further monitoring  - Obtain orthostatic VS  - f/u STAT labs  - Recommend obtaining abd imaging to r/o post-op complications/pathology  - Falls precautions, OOB w/ assistant ***Rapid Response Clinical Impact Physician Assistant Note***    33 y/o Male w/ no PMHx or PSHx who initially presented referred from outpatient MD's office for evaluation of RLQ abd pain x3 days. Pt now POD #0 from abd abscess drainage w/ Reji drain left in place.    Rapid response team called for witnessed syncope. Patient was seen and examined at the bedside by the rapid response team. On arrival to unit pt was conscious and A+Ox3 w/ situational awareness, laying supine on the ground in the hallway in care of primary team. Per primary team, pt was ambulating in the hallway for approx 10min in care of PCA when he began having generalized abd discomfort described as gas w/ associated belching, at which point he c/o feeling dizzy/lightheaded and was assisted first to a chair and then to the ground by staff members w/o sustaining any trauma. Witnesses report the pt's eyes rolled back as he was being assisted to the chair then ground, and was not responsive but appeared to be conscious for <1min before prompt return to baseline MS. Only identifiable prodrome was belching/gas, and no postictal s/sx reported, and pt w/o neurological deficits. VS: T 98.6F, HR 76bpm regular, /72, RR 20 non-labored, SpO2 98% on RA; FS BGL 94. EKG unremarkable, NSR w/o ischemic changes. Stat labs were obtained and pt was recommended to go for abdominal imaging to r/o potential surgical complications. Pt subsequently transferred to surgical tele for further monitoring.    Allergies  No Known Allergies or Intolerances      PAST MEDICAL & SURGICAL HISTORY:  No pertinent past medical history  No significant past surgical history      Vital Signs Last 24 Hrs  T(C): 37.4 (2023 20:30), Max: 38.2 (2023 01:00)  T(F): 99.3 (2023 20:30), Max: 100.8 (2023 01:00)  HR: 94 (2023 20:30) (85 - 112)  BP: 116/70 (2023 20:30) (108/67 - 132/82)  BP(mean): 80 (2023 05:00) (80 - 99)  RR: 18 (2023 20:30) (18 - 19)  SpO2: 98% (2023 20:30) (94% - 98%)    Parameters below as of 2023 20:30  Patient On (Oxygen Delivery Method): room air    ROS:  REVIEW OF SYSTEMS:    CONSTITUTIONAL: No weakness, fevers or chills  EYES/ENT: No visual changes;  No vertigo or throat pain   NECK: No pain or stiffness  RESPIRATORY: No cough, wheezing, hemoptysis; No shortness of breath  CARDIOVASCULAR: No chest pain or palpitations  GASTROINTESTINAL: Mild epigastric discomfort on palpation. No nausea, vomiting, or hematemesis; No diarrhea or constipation. No melena or hematochezia.  GENITOURINARY: No dysuria, frequency or hematuria  NEUROLOGICAL: No numbness or weakness  SKIN: No itching, burning, rashes, or lesions   All other review of systems is negative unless indicated above.    Physical exam:  GENERAL: The patient is awake and alert in no apparent distress.   HEENT: Head is normocephalic and atraumatic. Extraocular muscles are intact. Mucous membranes are moist. No throat erythema/exudates no lymphadenopathy, no JVD,   NECK: Supple.  LUNGS: Clear to auscultation BL without wheezing, rales or rhonchi; respirations unlabored  HEART: Regular rate and rhythm ,+S1/+S2, no murmurs, rubs, gallops  ABDOMEN: Soft, nontender, no rebound, guarding rigidity, bowel sounds in all 4 quadrants. Abd mildly distended w/o fluid wave. Reji drain placed in RLQ of abdoment draining serosanguinous fluid to bulb suction; no evidence of erythema/bleeding/discharge around insertion site.  EXTREMITIES: Without any cyanosis, clubbing, rash, lesions or edema.  SKIN: No new rashes or lesions.  MSK: strength equal BL  VASCULAR: Radial and Dorsal pedal pulses palpable BL.  NEUROLOGIC: Grossly intact.  PSYCH: No new changes.     @ :  -   @ 07:00  --------------------------------------------------------  IN: 1240 mL / OUT: 0 mL / NET: 1240 mL     @ 07: @ 22:54  --------------------------------------------------------  IN: 1540 mL / OUT: 1520 mL / NET: 20 mL                              14.6   11.64 )-----------( 334      ( 2023 22:40 )             42.0         138  |  104  |  14  ----------------------------<  102<H>  4.2   |  24  |  0.81    Ca    9.4      2023 05:30  Phos  3.6       Mg     2.0         TPro  7.5  /  Alb  4.2  /  TBili  1.3<H>  /  DBili  x   /  AST  35  /  ALT  129<H>  /  AlkPhos  84  19           LIVER FUNCTIONS - ( 2023 19:04 )  Alb: 4.2 g/dL / Pro: 7.5 g/dL / ALK PHOS: 84 U/L / ALT: 129 U/L / AST: 35 U/L / GGT: x         Urinalysis Basic - ( 2023 21:28 )    Color: Yellow / Appearance: Clear / S.020 / pH: x  Gluc: x / Ketone: NEGATIVE  / Bili: Negative / Urobili: 1.0 E.U./dL   Blood: x / Protein: NEGATIVE mg/dL / Nitrite: NEGATIVE   Leuk Esterase: NEGATIVE / RBC: x / WBC x   Sq Epi: x / Non Sq Epi: x / Bacteria: x         PT/INR - ( 2023 05:30 )   PT: 14.3 sec;   INR: 1.20     PTT - ( 2023 05:30 )  PTT:33.2 sec    MEDICATIONS  (STANDING):  cefTRIAXone   IVPB 1000 milliGRAM(s) IV Intermittent every 24 hours  heparin   Injectable 5000 Unit(s) SubCutaneous every 8 hours  lactated ringers Bolus 1000 milliLiter(s) IV Bolus once  lactated ringers. 1000 milliLiter(s) (130 mL/Hr) IV Continuous <Continuous>  metroNIDAZOLE  IVPB 500 milliGRAM(s) IV Intermittent every 8 hours      Assessment:  33 y/o Male w/ no PMHx or PSHx who initially presented referred from outpatient MD's office for evaluation of RLQ abd pain x3 days. Pt now POD #0 from abd abscess drainage w/ Reji drain left in place to bulb suction w/ serosanguinous drainage. Rapid response team called for witnessed syncope assisted to the ground w/o trauma. Syncope likely vasovagal in etiology. Pt subsequently transferred to surgical tele for further monitoring.    Differential diagnosis to include:  - Vasovagal syncope 2/2 gas/belching most likely given presentation, normotension, normal BGL/H&H and no significant findings  - Arrythmia possible but less likely given no cardiac hx and pt was ambulating for approx 10min w/o symptoms  - Seizure possible but less likely given no seizure hx, atypical prodrome of belching/gas, and no postictal period      Plan-  - Transfer to telemetry floor for further monitoring  - Obtain orthostatic VS  - f/u STAT labs  - Recommend obtaining abd imaging to r/o post-op complications/pathology  - Falls precautions, OOB w/ assistant      I have personally and independently provided 31 minutes of critical care services.  This excludes any time spent on separate procedures or teaching.

## 2023-04-20 NOTE — PROGRESS NOTE ADULT - ASSESSMENT
32M w/ no PMHx or PSHx presents for 3 days of RLQ abd pain. Pt states his pain began after lunch 3 days ago that he describes as sharp pain, worse with movement. He states his pain has not been resolving and he went to Dr. Flores's office today for evaluation and was sent in to the ED. Pt states he has been having a lot of burping over this time w/ decreased flatus. Endorses small yellow diarrhea. Minimal PO intake. CT shows perforated appendicitis w/ 6x6x6 cm abscess and small amount of fluid in the pelvis. Admitted for perforated appendicitis w. abscess and small fluid in pelvis.     IR drainage today  NPO/IVF  ANDRES   CTX/FLG  pain/nausea prn   OOB/IS/SCDs

## 2023-04-20 NOTE — CHART NOTE - NSCHARTNOTEFT_GEN_A_CORE
INCOMPLETE INCOMPLETE     Rapid response called to 9 [facility] @ 2204. [facility] patient found to be on the floor after ambulating with nursing staff. Per nursing staff, patient was ambulating for approximately 10 minutes and started to belch leading him to loss of consciousness for about 5 seconds and regained consciousness immediately. Patient did not hit his head as he was assisted to the floor. Patient mentating well and able to communicate with writer. Vitals immediately taken with: BP: 103/62 HR 76 RR 15 O2 sat 98% RA, Temp 98.5 F. Patient immediately transferred to bed. EKG with normal sinus rhythm with no ischemic changes or ST elevations. STAT labs obtained and significant with Hgb 14.6 (13.7), Lactate 1.5, WBC 11.64 (9.41).                         14.6   11.64 )-----------( 334      ( 20 Apr 2023 22:40 )             42.0         Patient remains A&Ox4, feels better after being back in bed. Of note, prior to this event, writer saw patient approximately at 7pm for serial abdominal exam. Patient reports feeling better, denies CP, SOB, nausea, vomiting, abdominal pain improved after IR procedure today. Abd soft, mildly distended, tender to palpation to RLQ with no rebound or guarding. Patient went for IR drain placement for abscess found in CT. IR drain output documented from day shift was 20cc and on exam approx 20cc noted in OSCAR bulb.       Decision was made to step patient up to telemetry for closer monitoring. Discussed with chief resident on call and attending Dr. Flores.     Plan:   - Upgrade to telemetry   - Monitor VS, I&Os, Rapid response called to 9 [facility]ris @ 2204. On arrival to [facility], patient found to be on the floor after ambulating with nursing staff. Per nursing staff, patient was ambulating for approximately 10 minutes and started to belch leading him to loss of consciousness for about 5 seconds and regained consciousness immediately. Patient did not hit his head as he was assisted to the floor. Patient mentating well and able to communicate with writer. Vitals immediately taken with: BP: 103/62 HR 76 RR 15 O2 sat 98% RA, Temp 98.5 F. Patient immediately transferred to bed. EKG with normal sinus rhythm with no ischemic changes or ST elevations. STAT labs obtained and significant with Hgb 14.6 (13.7), Lactate 1.5, WBC 11.64 (9.41).                         14.6   11.64 )-----------( 334      ( 20 Apr 2023 22:40 )             42.0       04-20    137  |  100  |  12  ----------------------------<  116<H>  4.3   |  25  |  0.85    Ca    9.8      20 Apr 2023 22:40  Phos  3.0     04-20  Mg     2.0     04-20    TPro  7.3  /  Alb  3.9  /  TBili  2.4<H>  /  DBili  x   /  AST  26  /  ALT  86<H>  /  AlkPhos  91  04-20      Patient remains A&Ox4, feels better after being back in bed. Of note, prior to this event, writer saw patient approximately at 7pm for serial abdominal exam. Patient reports feeling better, denies CP, SOB, nausea, vomiting, abdominal pain improved after IR procedure today. Abd soft, mildly distended, tender to palpation to RLQ with no rebound or guarding. Patient went for IR drain placement for abscess found in CT. IR drain output documented from day shift was 20cc and on exam approx 20cc noted in OSCAR bulb.       Decision was made to step patient up to telemetry for closer monitoring. Discussed with chief resident and attending Dr. Flores regarding plan.     Plan:   - Upgrade to telemetry   - Monitor VS, I&Os   - Monitor OSCAR output   - s/p 1L LR bolus  - No plan for AXR imaging per attending   - Continue to monitor closely

## 2023-04-21 LAB
ANION GAP SERPL CALC-SCNC: 11 MMOL/L — SIGNIFICANT CHANGE UP (ref 5–17)
BUN SERPL-MCNC: 10 MG/DL — SIGNIFICANT CHANGE UP (ref 7–23)
CALCIUM SERPL-MCNC: 9.4 MG/DL — SIGNIFICANT CHANGE UP (ref 8.4–10.5)
CHLORIDE SERPL-SCNC: 101 MMOL/L — SIGNIFICANT CHANGE UP (ref 96–108)
CO2 SERPL-SCNC: 25 MMOL/L — SIGNIFICANT CHANGE UP (ref 22–31)
CREAT SERPL-MCNC: 0.83 MG/DL — SIGNIFICANT CHANGE UP (ref 0.5–1.3)
EGFR: 119 ML/MIN/1.73M2 — SIGNIFICANT CHANGE UP
GLUCOSE BLDC GLUCOMTR-MCNC: 95 MG/DL — SIGNIFICANT CHANGE UP (ref 70–99)
GLUCOSE SERPL-MCNC: 94 MG/DL — SIGNIFICANT CHANGE UP (ref 70–99)
HCT VFR BLD CALC: 39.8 % — SIGNIFICANT CHANGE UP (ref 39–50)
HGB BLD-MCNC: 13.7 G/DL — SIGNIFICANT CHANGE UP (ref 13–17)
MAGNESIUM SERPL-MCNC: 1.8 MG/DL — SIGNIFICANT CHANGE UP (ref 1.6–2.6)
MCHC RBC-ENTMCNC: 30.4 PG — SIGNIFICANT CHANGE UP (ref 27–34)
MCHC RBC-ENTMCNC: 34.4 GM/DL — SIGNIFICANT CHANGE UP (ref 32–36)
MCV RBC AUTO: 88.4 FL — SIGNIFICANT CHANGE UP (ref 80–100)
NRBC # BLD: 0 /100 WBCS — SIGNIFICANT CHANGE UP (ref 0–0)
PHOSPHATE SERPL-MCNC: 3.2 MG/DL — SIGNIFICANT CHANGE UP (ref 2.5–4.5)
PLATELET # BLD AUTO: 325 K/UL — SIGNIFICANT CHANGE UP (ref 150–400)
POTASSIUM SERPL-MCNC: 4.1 MMOL/L — SIGNIFICANT CHANGE UP (ref 3.5–5.3)
POTASSIUM SERPL-SCNC: 4.1 MMOL/L — SIGNIFICANT CHANGE UP (ref 3.5–5.3)
RBC # BLD: 4.5 M/UL — SIGNIFICANT CHANGE UP (ref 4.2–5.8)
RBC # FLD: 12.3 % — SIGNIFICANT CHANGE UP (ref 10.3–14.5)
SODIUM SERPL-SCNC: 137 MMOL/L — SIGNIFICANT CHANGE UP (ref 135–145)
WBC # BLD: 8.51 K/UL — SIGNIFICANT CHANGE UP (ref 3.8–10.5)
WBC # FLD AUTO: 8.51 K/UL — SIGNIFICANT CHANGE UP (ref 3.8–10.5)

## 2023-04-21 PROCEDURE — 99291 CRITICAL CARE FIRST HOUR: CPT

## 2023-04-21 RX ORDER — MAGNESIUM SULFATE 500 MG/ML
2 VIAL (ML) INJECTION ONCE
Refills: 0 | Status: COMPLETED | OUTPATIENT
Start: 2023-04-21 | End: 2023-04-21

## 2023-04-21 RX ADMIN — HEPARIN SODIUM 5000 UNIT(S): 5000 INJECTION INTRAVENOUS; SUBCUTANEOUS at 06:49

## 2023-04-21 RX ADMIN — HEPARIN SODIUM 5000 UNIT(S): 5000 INJECTION INTRAVENOUS; SUBCUTANEOUS at 22:36

## 2023-04-21 RX ADMIN — HEPARIN SODIUM 5000 UNIT(S): 5000 INJECTION INTRAVENOUS; SUBCUTANEOUS at 14:39

## 2023-04-21 RX ADMIN — Medication 100 MILLIGRAM(S): at 14:39

## 2023-04-21 RX ADMIN — Medication 100 MILLIGRAM(S): at 06:49

## 2023-04-21 RX ADMIN — SODIUM CHLORIDE 130 MILLILITER(S): 9 INJECTION, SOLUTION INTRAVENOUS at 05:54

## 2023-04-21 RX ADMIN — CEFTRIAXONE 100 MILLIGRAM(S): 500 INJECTION, POWDER, FOR SOLUTION INTRAMUSCULAR; INTRAVENOUS at 07:55

## 2023-04-21 RX ADMIN — SODIUM CHLORIDE 130 MILLILITER(S): 9 INJECTION, SOLUTION INTRAVENOUS at 14:46

## 2023-04-21 RX ADMIN — Medication 100 MILLIGRAM(S): at 22:36

## 2023-04-21 RX ADMIN — Medication 25 GRAM(S): at 16:53

## 2023-04-21 NOTE — PROGRESS NOTE ADULT - ASSESSMENT
32M w/ no PMHx or PSHx presents for 3 days of RLQ abd pain. Pt states his pain began after lunch 3 days ago that he describes as sharp pain, worse with movement. He states his pain has not been resolving and he went to Dr. Flores's office today for evaluation and was sent in to the ED. Pt states he has been having a lot of burping over this time w/ decreased flatus. Endorses small yellow diarrhea. Minimal PO intake. CT shows perforated appendicitis w/ 6x6x6 cm abscess and small amount of fluid in the pelvis. Admitted for perforated appendicitis w. abscess and small fluid in pelvis. S/p IR drainage 4/20. AROBF    IR drainage 4/20  Advance to CLD/ Decrease IVF  CTX/FLG  pain/nausea prn   OOB/IS/SCDs

## 2023-04-22 LAB
ALBUMIN SERPL ELPH-MCNC: 3.5 G/DL — SIGNIFICANT CHANGE UP (ref 3.3–5)
ALP SERPL-CCNC: 89 U/L — SIGNIFICANT CHANGE UP (ref 40–120)
ALT FLD-CCNC: 69 U/L — HIGH (ref 10–45)
ANION GAP SERPL CALC-SCNC: 13 MMOL/L — SIGNIFICANT CHANGE UP (ref 5–17)
AST SERPL-CCNC: 31 U/L — SIGNIFICANT CHANGE UP (ref 10–40)
BILIRUB SERPL-MCNC: 1.4 MG/DL — HIGH (ref 0.2–1.2)
BUN SERPL-MCNC: 10 MG/DL — SIGNIFICANT CHANGE UP (ref 7–23)
CALCIUM SERPL-MCNC: 9.3 MG/DL — SIGNIFICANT CHANGE UP (ref 8.4–10.5)
CHLORIDE SERPL-SCNC: 103 MMOL/L — SIGNIFICANT CHANGE UP (ref 96–108)
CO2 SERPL-SCNC: 20 MMOL/L — LOW (ref 22–31)
CREAT SERPL-MCNC: 0.73 MG/DL — SIGNIFICANT CHANGE UP (ref 0.5–1.3)
EGFR: 123 ML/MIN/1.73M2 — SIGNIFICANT CHANGE UP
GLUCOSE SERPL-MCNC: 92 MG/DL — SIGNIFICANT CHANGE UP (ref 70–99)
HCT VFR BLD CALC: 41.9 % — SIGNIFICANT CHANGE UP (ref 39–50)
HGB BLD-MCNC: 14 G/DL — SIGNIFICANT CHANGE UP (ref 13–17)
MAGNESIUM SERPL-MCNC: 2 MG/DL — SIGNIFICANT CHANGE UP (ref 1.6–2.6)
MCHC RBC-ENTMCNC: 31 PG — SIGNIFICANT CHANGE UP (ref 27–34)
MCHC RBC-ENTMCNC: 33.4 GM/DL — SIGNIFICANT CHANGE UP (ref 32–36)
MCV RBC AUTO: 92.7 FL — SIGNIFICANT CHANGE UP (ref 80–100)
NRBC # BLD: 0 /100 WBCS — SIGNIFICANT CHANGE UP (ref 0–0)
PHOSPHATE SERPL-MCNC: 3 MG/DL — SIGNIFICANT CHANGE UP (ref 2.5–4.5)
PLATELET # BLD AUTO: 325 K/UL — SIGNIFICANT CHANGE UP (ref 150–400)
POTASSIUM SERPL-MCNC: 4 MMOL/L — SIGNIFICANT CHANGE UP (ref 3.5–5.3)
POTASSIUM SERPL-SCNC: 4 MMOL/L — SIGNIFICANT CHANGE UP (ref 3.5–5.3)
PROT SERPL-MCNC: 6.8 G/DL — SIGNIFICANT CHANGE UP (ref 6–8.3)
RBC # BLD: 4.52 M/UL — SIGNIFICANT CHANGE UP (ref 4.2–5.8)
RBC # FLD: 12.5 % — SIGNIFICANT CHANGE UP (ref 10.3–14.5)
SODIUM SERPL-SCNC: 136 MMOL/L — SIGNIFICANT CHANGE UP (ref 135–145)
WBC # BLD: 7.56 K/UL — SIGNIFICANT CHANGE UP (ref 3.8–10.5)
WBC # FLD AUTO: 7.56 K/UL — SIGNIFICANT CHANGE UP (ref 3.8–10.5)

## 2023-04-22 RX ADMIN — Medication 100 MILLIGRAM(S): at 22:43

## 2023-04-22 RX ADMIN — Medication 100 MILLIGRAM(S): at 15:08

## 2023-04-22 RX ADMIN — CEFTRIAXONE 100 MILLIGRAM(S): 500 INJECTION, POWDER, FOR SOLUTION INTRAMUSCULAR; INTRAVENOUS at 07:15

## 2023-04-22 RX ADMIN — SODIUM CHLORIDE 65 MILLILITER(S): 9 INJECTION, SOLUTION INTRAVENOUS at 07:18

## 2023-04-22 RX ADMIN — HEPARIN SODIUM 5000 UNIT(S): 5000 INJECTION INTRAVENOUS; SUBCUTANEOUS at 14:58

## 2023-04-22 RX ADMIN — Medication 100 MILLIGRAM(S): at 05:05

## 2023-04-22 RX ADMIN — HEPARIN SODIUM 5000 UNIT(S): 5000 INJECTION INTRAVENOUS; SUBCUTANEOUS at 05:05

## 2023-04-22 RX ADMIN — HEPARIN SODIUM 5000 UNIT(S): 5000 INJECTION INTRAVENOUS; SUBCUTANEOUS at 22:43

## 2023-04-22 NOTE — PROGRESS NOTE ADULT - ASSESSMENT
32 M w/o sig PMH or PSH pw RLQ pain and found to have perforated appendicitis w/ 6x6x6x cm abscess and pelvic fluid. Patient is now s/p RLQ drain by IR on 4/20.    -Monitor output q8H  -IR to continue following

## 2023-04-23 ENCOUNTER — TRANSCRIPTION ENCOUNTER (OUTPATIENT)
Age: 33
End: 2023-04-23

## 2023-04-23 VITALS
TEMPERATURE: 98 F | RESPIRATION RATE: 16 BRPM | HEART RATE: 79 BPM | DIASTOLIC BLOOD PRESSURE: 75 MMHG | SYSTOLIC BLOOD PRESSURE: 120 MMHG | OXYGEN SATURATION: 97 %

## 2023-04-23 LAB
ANION GAP SERPL CALC-SCNC: 9 MMOL/L — SIGNIFICANT CHANGE UP (ref 5–17)
BUN SERPL-MCNC: 14 MG/DL — SIGNIFICANT CHANGE UP (ref 7–23)
CALCIUM SERPL-MCNC: 8.9 MG/DL — SIGNIFICANT CHANGE UP (ref 8.4–10.5)
CHLORIDE SERPL-SCNC: 103 MMOL/L — SIGNIFICANT CHANGE UP (ref 96–108)
CO2 SERPL-SCNC: 26 MMOL/L — SIGNIFICANT CHANGE UP (ref 22–31)
CREAT SERPL-MCNC: 0.96 MG/DL — SIGNIFICANT CHANGE UP (ref 0.5–1.3)
EGFR: 107 ML/MIN/1.73M2 — SIGNIFICANT CHANGE UP
GLUCOSE SERPL-MCNC: 98 MG/DL — SIGNIFICANT CHANGE UP (ref 70–99)
HCT VFR BLD CALC: 39.4 % — SIGNIFICANT CHANGE UP (ref 39–50)
HGB BLD-MCNC: 13.6 G/DL — SIGNIFICANT CHANGE UP (ref 13–17)
MAGNESIUM SERPL-MCNC: 2 MG/DL — SIGNIFICANT CHANGE UP (ref 1.6–2.6)
MCHC RBC-ENTMCNC: 30.8 PG — SIGNIFICANT CHANGE UP (ref 27–34)
MCHC RBC-ENTMCNC: 34.5 GM/DL — SIGNIFICANT CHANGE UP (ref 32–36)
MCV RBC AUTO: 89.3 FL — SIGNIFICANT CHANGE UP (ref 80–100)
NRBC # BLD: 0 /100 WBCS — SIGNIFICANT CHANGE UP (ref 0–0)
PHOSPHATE SERPL-MCNC: 3.2 MG/DL — SIGNIFICANT CHANGE UP (ref 2.5–4.5)
PLATELET # BLD AUTO: 340 K/UL — SIGNIFICANT CHANGE UP (ref 150–400)
POTASSIUM SERPL-MCNC: 4.3 MMOL/L — SIGNIFICANT CHANGE UP (ref 3.5–5.3)
POTASSIUM SERPL-SCNC: 4.3 MMOL/L — SIGNIFICANT CHANGE UP (ref 3.5–5.3)
RBC # BLD: 4.41 M/UL — SIGNIFICANT CHANGE UP (ref 4.2–5.8)
RBC # FLD: 12.6 % — SIGNIFICANT CHANGE UP (ref 10.3–14.5)
SODIUM SERPL-SCNC: 138 MMOL/L — SIGNIFICANT CHANGE UP (ref 135–145)
WBC # BLD: 5.34 K/UL — SIGNIFICANT CHANGE UP (ref 3.8–10.5)
WBC # FLD AUTO: 5.34 K/UL — SIGNIFICANT CHANGE UP (ref 3.8–10.5)

## 2023-04-23 PROCEDURE — 85027 COMPLETE CBC AUTOMATED: CPT

## 2023-04-23 PROCEDURE — 49406 IMAGE CATH FLUID PERI/RETRO: CPT

## 2023-04-23 PROCEDURE — 87205 SMEAR GRAM STAIN: CPT

## 2023-04-23 PROCEDURE — 80048 BASIC METABOLIC PNL TOTAL CA: CPT

## 2023-04-23 PROCEDURE — 85730 THROMBOPLASTIN TIME PARTIAL: CPT

## 2023-04-23 PROCEDURE — 86901 BLOOD TYPING SEROLOGIC RH(D): CPT

## 2023-04-23 PROCEDURE — C1769: CPT

## 2023-04-23 PROCEDURE — C1729: CPT

## 2023-04-23 PROCEDURE — 96365 THER/PROPH/DIAG IV INF INIT: CPT

## 2023-04-23 PROCEDURE — 83605 ASSAY OF LACTIC ACID: CPT

## 2023-04-23 PROCEDURE — 86850 RBC ANTIBODY SCREEN: CPT

## 2023-04-23 PROCEDURE — 85610 PROTHROMBIN TIME: CPT

## 2023-04-23 PROCEDURE — 87070 CULTURE OTHR SPECIMN AEROBIC: CPT

## 2023-04-23 PROCEDURE — 86900 BLOOD TYPING SEROLOGIC ABO: CPT

## 2023-04-23 PROCEDURE — 87075 CULTR BACTERIA EXCEPT BLOOD: CPT

## 2023-04-23 PROCEDURE — 84100 ASSAY OF PHOSPHORUS: CPT

## 2023-04-23 PROCEDURE — 36415 COLL VENOUS BLD VENIPUNCTURE: CPT

## 2023-04-23 PROCEDURE — 74177 CT ABD & PELVIS W/CONTRAST: CPT | Mod: MG

## 2023-04-23 PROCEDURE — 99285 EMERGENCY DEPT VISIT HI MDM: CPT | Mod: 25

## 2023-04-23 PROCEDURE — 81003 URINALYSIS AUTO W/O SCOPE: CPT

## 2023-04-23 PROCEDURE — 96375 TX/PRO/DX INJ NEW DRUG ADDON: CPT

## 2023-04-23 PROCEDURE — 83735 ASSAY OF MAGNESIUM: CPT

## 2023-04-23 PROCEDURE — G1004: CPT

## 2023-04-23 PROCEDURE — 82962 GLUCOSE BLOOD TEST: CPT

## 2023-04-23 PROCEDURE — 80053 COMPREHEN METABOLIC PANEL: CPT

## 2023-04-23 RX ORDER — CEFPODOXIME PROXETIL 100 MG
1 TABLET ORAL
Qty: 8 | Refills: 0
Start: 2023-04-23 | End: 2023-04-26

## 2023-04-23 RX ORDER — METRONIDAZOLE 500 MG
1 TABLET ORAL
Qty: 12 | Refills: 0
Start: 2023-04-23 | End: 2023-04-26

## 2023-04-23 RX ADMIN — Medication 100 MILLIGRAM(S): at 13:01

## 2023-04-23 RX ADMIN — CEFTRIAXONE 100 MILLIGRAM(S): 500 INJECTION, POWDER, FOR SOLUTION INTRAMUSCULAR; INTRAVENOUS at 07:03

## 2023-04-23 RX ADMIN — HEPARIN SODIUM 5000 UNIT(S): 5000 INJECTION INTRAVENOUS; SUBCUTANEOUS at 05:53

## 2023-04-23 RX ADMIN — Medication 100 MILLIGRAM(S): at 05:53

## 2023-04-23 RX ADMIN — HEPARIN SODIUM 5000 UNIT(S): 5000 INJECTION INTRAVENOUS; SUBCUTANEOUS at 14:38

## 2023-04-23 NOTE — PROGRESS NOTE ADULT - REASON FOR ADMISSION
Perforated Appendicitis

## 2023-04-23 NOTE — DISCHARGE NOTE PROVIDER - HOSPITAL COURSE
32 year old male with no PMHx or PSHx presented to the hospital for 3 days of right lower quadrant abdominal pain and minimal PO intake. CT abdominal and pelvis performed which revealed a perforated appendicitis with 6x6x6 cm abscess and small amount of fluid in the pelvis. Patient was admitted for IR drainage of the abscess found in CT. On 4/20, patient underwent IR drainage with a 10 Fr OSCAR in place. Post procedure, patient had a vasovagal episode after ambulating in the hallways with no head trauma. Vitals remained stable at the time and labs were unremarkable. Since then, patient has been tolerating low residual diet, passing gas and having bowel movements, no nausea or vomiting, ambulating with no difficulties. On day of discharge, patient remained hemodynamically stable and ready for discharge with oral antibiotics and OSCAR drain with plan to follow up with Dr. Flores in 1 week.

## 2023-04-23 NOTE — DISCHARGE NOTE PROVIDER - NSDCCPCAREPLAN_GEN_ALL_CORE_FT
PRINCIPAL DISCHARGE DIAGNOSIS  Diagnosis: Perforated appendicitis  Assessment and Plan of Treatment:

## 2023-04-23 NOTE — PROGRESS NOTE ADULT - SUBJECTIVE AND OBJECTIVE BOX
ON: +F/-BM    SUBJECTIVE: Pt seen and examined at bedside this am by surgery team. Patient is lying comfortably in bed with no complaints. Tolerating diet, pain well controlled with current regimen. Patient denies fever, nausea, vomiting, chest pain, and shortness of breath. Patient is passing gas.     MEDICATIONS  (STANDING):  cefTRIAXone   IVPB 1000 milliGRAM(s) IV Intermittent every 24 hours  heparin   Injectable 5000 Unit(s) SubCutaneous every 8 hours  metroNIDAZOLE  IVPB 500 milliGRAM(s) IV Intermittent every 8 hours    MEDICATIONS  (PRN):      Vital Signs Last 24 Hrs  T(C): 36.6 (23 Apr 2023 04:00), Max: 37.1 (22 Apr 2023 16:21)  T(F): 97.9 (23 Apr 2023 04:00), Max: 98.7 (22 Apr 2023 16:21)  HR: 73 (23 Apr 2023 04:00) (73 - 102)  BP: 118/78 (23 Apr 2023 04:00) (112/70 - 123/76)  BP(mean): --  RR: 16 (23 Apr 2023 04:00) (16 - 18)  SpO2: 96% (23 Apr 2023 04:00) (95% - 96%)    Parameters below as of 23 Apr 2023 04:00  Patient On (Oxygen Delivery Method): room air        Physical Exam  General: Patient is doing well and lying in bed comfortably  Constitutional: alert and oriented   Pulm: Nonlabored breathing, no respiratory distress  CV: Regular rate and rhythm, normal sinus rhythm  Abd:  soft, nontender, nondistended. No rebound, no guarding. OSCAR x1 w. ss op             Incisions: clean, dry, intact and healing well, no erythema/induration/edema  Extremities: warm, well perfused, no edema    I&O's Detail    22 Apr 2023 07:01  -  23 Apr 2023 07:00  --------------------------------------------------------  IN:    IV PiggyBack: 200 mL    Lactated Ringers: 585 mL  Total IN: 785 mL    OUT:    Bulb (mL): 22 mL    Voided (mL): 3130 mL  Total OUT: 3152 mL    Total NET: -2367 mL        LABS:                        13.6   5.34  )-----------( 340      ( 23 Apr 2023 05:30 )             39.4     04-23    138  |  103  |  14  ----------------------------<  98  4.3   |  26  |  0.96    Ca    8.9      23 Apr 2023 05:30  Phos  3.2     04-23  Mg     2.0     04-23    TPro  6.8  /  Alb  3.5  /  TBili  1.4<H>  /  DBili  x   /  AST  31  /  ALT  69<H>  /  AlkPhos  89  04-22      
STATUS POST:  IR drainage      ON: : ANDRES unchanged - RRT @ 2230 for vasovagal episode after ambulating in the hallways - no head trauma, assisted fall to ground, VSS, labs wnl, 1L bolus, IR drain serous op, stepup to tele     SUBJECTIVE: Patient seen and examined bedside by chief resident, ttp around drain site. Tolerating diet without nausea or vomiting. No flatus or bowel movements. Denies SOB/CP/RIVER/palpations/dizziness    cefTRIAXone   IVPB 1000 milliGRAM(s) IV Intermittent every 24 hours  heparin   Injectable 5000 Unit(s) SubCutaneous every 8 hours  metroNIDAZOLE  IVPB 500 milliGRAM(s) IV Intermittent every 8 hours      Vital Signs Last 24 Hrs  T(C): 37.3 (2023 12:50), Max: 37.4 (2023 20:30)  T(F): 99.2 (2023 12:50), Max: 99.3 (2023 20:30)  HR: 93 (2023 12:50) (87 - 95)  BP: 111/74 (2023 12:50) (105/67 - 116/70)  BP(mean): --  RR: 18 (2023 12:50) (17 - 18)  SpO2: 94% (2023 12:50) (94% - 98%)    Parameters below as of 2023 12:50  Patient On (Oxygen Delivery Method): room air      I&O's Detail    2023 07:01  -  2023 07:00  --------------------------------------------------------  IN:    Lactated Ringers: 2080 mL    Oral Fluid: 240 mL  Total IN: 2320 mL    OUT:    Bulb (mL): 30 mL    Voided (mL): 2270 mL  Total OUT: 2300 mL    Total NET: 20 mL      2023 07:01  -  2023 15:13  --------------------------------------------------------  IN:    Lactated Ringers: 910 mL    Oral Fluid: 400 mL  Total IN: 1310 mL    OUT:    Bulb (mL): 2 mL    Voided (mL): 800 mL  Total OUT: 802 mL    Total NET: 508 mL          General: NAD, resting comfortably in bed  C/V: NSR, nonbrady nontachy  Pulm: Nonlabored breathing, no respiratory distress, room air  Abd: soft,appropriately ttp near incisional site. No rebound or guarding  Incisions; c/d/i  OSCAR; to suction, ss  Extrem: WWP, no edema, SCDs in place        LABS:                        13.7   8.51  )-----------( 325      ( 2023 07:14 )             39.8     04-    137  |  101  |  10  ----------------------------<  94  4.1   |  25  |  0.83    Ca    9.4      2023 07:14  Phos  3.2     04-  Mg     1.8     -    TPro  7.3  /  Alb  3.9  /  TBili  2.4<H>  /  DBili  x   /  AST  26  /  ALT  86<H>  /  AlkPhos  91  04-20    PT/INR - ( 2023 22:40 )   PT: 15.0 sec;   INR: 1.26          PTT - ( 2023 22:40 )  PTT:29.5 sec  Urinalysis Basic - ( 2023 21:28 )    Color: Yellow / Appearance: Clear / S.020 / pH: x  Gluc: x / Ketone: NEGATIVE  / Bili: Negative / Urobili: 1.0 E.U./dL   Blood: x / Protein: NEGATIVE mg/dL / Nitrite: NEGATIVE   Leuk Esterase: NEGATIVE / RBC: x / WBC x   Sq Epi: x / Non Sq Epi: x / Bacteria: x        RADIOLOGY & ADDITIONAL STUDIES:  
SUBJECTIVE:  Patient seen and examined on AM rounds with chief resident. Overnight, serial abdominal exam was done (soft, mild RLQ tenderness, non-distended, no signs of peritonitis). He spiked a fever (TMax 100.3), which resolved with x1 Tylenol. This AM, patient is feeling ok. He continues to report RLQ pain. Denies nausea and vomiting. Voiding without issue. Currently NPO for IR abscess drainage.     MEDICATIONS  (STANDING):  cefTRIAXone   IVPB 1000 milliGRAM(s) IV Intermittent every 24 hours  lactated ringers. 1000 milliLiter(s) (130 mL/Hr) IV Continuous <Continuous>  metroNIDAZOLE  IVPB 500 milliGRAM(s) IV Intermittent every 8 hours    MEDICATIONS  (PRN):      Vital Signs Last 24 Hrs  T(C): 36.9 (2023 08:50), Max: 38.2 (2023 01:00)  T(F): 98.4 (2023 08:50), Max: 100.8 (2023 01:00)  HR: 87 (2023 08:50) (85 - 112)  BP: 115/73 (2023 08:50) (108/67 - 132/82)  BP(mean): 80 (2023 05:00) (80 - 99)  RR: 18 (2023 08:50) (17 - 19)  SpO2: 96% (2023 08:50) (94% - 98%)    Parameters below as of 2023 08:50  Patient On (Oxygen Delivery Method): room air    Physical Exam:  General: NAD, resting comfortably in bed  Pulmonary: Nonlabored breathing, no respiratory distress  Cardiovascular: NSR  Abdominal: soft, non-distended, RLQ tenderness   Extremities: WWP, normal strength    I&O's Summary    2023 07:01  -  2023 07:00  --------------------------------------------------------  IN: 1240 mL / OUT: 0 mL / NET: 1240 mL    2023 07:01  -  2023 12:01  --------------------------------------------------------  IN: 0 mL / OUT: 1100 mL / NET: -1100 mL        LABS:                        13.7   9.41  )-----------( 323      ( 2023 05:30 )             40.0     04-20    138  |  104  |  14  ----------------------------<  102<H>  4.2   |  24  |  0.81    Ca    9.4      2023 05:30  Phos  3.6     04-20  Mg     2.0     04-20    TPro  7.5  /  Alb  4.2  /  TBili  1.3<H>  /  DBili  x   /  AST  35  /  ALT  129<H>  /  AlkPhos  84  04-19    PT/INR - ( 2023 05:30 )   PT: 14.3 sec;   INR: 1.20          PTT - ( 2023 05:30 )  PTT:33.2 sec  Urinalysis Basic - ( 2023 21:28 )    Color: Yellow / Appearance: Clear / S.020 / pH: x  Gluc: x / Ketone: NEGATIVE  / Bili: Negative / Urobili: 1.0 E.U./dL   Blood: x / Protein: NEGATIVE mg/dL / Nitrite: NEGATIVE   Leuk Esterase: NEGATIVE / RBC: x / WBC x   Sq Epi: x / Non Sq Epi: x / Bacteria: x      CAPILLARY BLOOD GLUCOSE        LIVER FUNCTIONS - ( 2023 19:04 )  Alb: 4.2 g/dL / Pro: 7.5 g/dL / ALK PHOS: 84 U/L / ALT: 129 U/L / AST: 35 U/L / GGT: x       
    SUBJECTIVE: Seen and evaluated at bedside. NAEON. Resting comfortably, tolerating diet, denies any nausea, vomiting fever chills. Endorses flatus, denies any BM.       MEDICATIONS  (STANDING):  cefTRIAXone   IVPB 1000 milliGRAM(s) IV Intermittent every 24 hours  heparin   Injectable 5000 Unit(s) SubCutaneous every 8 hours  lactated ringers. 1000 milliLiter(s) (65 mL/Hr) IV Continuous <Continuous>  metroNIDAZOLE  IVPB 500 milliGRAM(s) IV Intermittent every 8 hours    MEDICATIONS  (PRN):      Vital Signs Last 24 Hrs  T(C): 36.7 (22 Apr 2023 05:04), Max: 37.3 (21 Apr 2023 12:50)  T(F): 98.1 (22 Apr 2023 05:04), Max: 99.2 (21 Apr 2023 12:50)  HR: 92 (22 Apr 2023 05:04) (92 - 95)  BP: 121/78 (22 Apr 2023 05:04) (111/74 - 121/78)  BP(mean): --  RR: 20 (22 Apr 2023 05:04) (17 - 20)  SpO2: 96% (22 Apr 2023 05:04) (93% - 98%)    Parameters below as of 22 Apr 2023 05:04  Patient On (Oxygen Delivery Method): room air            General: NAD, resting comfortably in bed  C/V: NSR, nonbrady nontachy  Pulm: Nonlabored breathing, no respiratory distress, room air  Abd: soft,appropriately ttp near incisional site. No rebound or guarding  Incisions; c/d/i  OSCAR; to suction, ss  Extrem: WWP, no edema, SCDs in place      I&O's Summary    21 Apr 2023 07:01  -  22 Apr 2023 07:00  --------------------------------------------------------  IN: 2917.5 mL / OUT: 3166 mL / NET: -248.5 mL        LABS:                        14.0   7.56  )-----------( 325      ( 22 Apr 2023 05:30 )             41.9     04-22    136  |  103  |  10  ----------------------------<  92  4.0   |  20<L>  |  0.73    Ca    9.3      22 Apr 2023 05:30  Phos  3.0     04-22  Mg     2.0     04-22    TPro  6.8  /  Alb  3.5  /  TBili  1.4<H>  /  DBili  x   /  AST  31  /  ALT  69<H>  /  AlkPhos  89  04-22    PT/INR - ( 20 Apr 2023 22:40 )   PT: 15.0 sec;   INR: 1.26          PTT - ( 20 Apr 2023 22:40 )  PTT:29.5 sec    CAPILLARY BLOOD GLUCOSE        LIVER FUNCTIONS - ( 22 Apr 2023 05:30 )  Alb: 3.5 g/dL / Pro: 6.8 g/dL / ALK PHOS: 89 U/L / ALT: 69 U/L / AST: 31 U/L / GGT: x             RADIOLOGY & ADDITIONAL STUDIES:  
less pain  passing flatus  tolerating po  AVSS  abd soft  less tender  NL WBC    OOB  Decrease IVF  Cont IVAB
RLQ drain with 16 cc of alesia colored serous fluid in 24hrs, 30 cc in the previous 24 hrs. Flushed with 3-4 cc without resistance.

## 2023-04-23 NOTE — DISCHARGE NOTE NURSING/CASE MANAGEMENT/SOCIAL WORK - NSDCPEFALRISK_GEN_ALL_CORE
For information on Fall & Injury Prevention, visit: [email or web address] OR  [email or web address] OR  https://www.cdc.gov/steadi/patient.html

## 2023-04-23 NOTE — DISCHARGE NOTE NURSING/CASE MANAGEMENT/SOCIAL WORK - PATIENT PORTAL LINK FT
You can access the FollowMyHealth Patient Portal offered by [facility] by registering at the following website: [email or web address]. By joining "[employer]’s FollowMyHealth portal, you will also be able to view your health information using other applications (apps) compatible with our system.

## 2023-04-23 NOTE — DISCHARGE NOTE PROVIDER - CARE PROVIDER_API CALL
Leonard Flores  COLON/RECTAL SURGERY  [address], [address]  [place] [address]  Phone: [phone]  Fax: [phone]  Follow Up Time: 1 week

## 2023-04-23 NOTE — PROGRESS NOTE ADULT - ASSESSMENT
32M w/ no PMHx or PSHx presents for 3 days of RLQ abd pain. Pt states his pain began after lunch 3 days ago that he describes as sharp pain, worse with movement. He states his pain has not been resolving and he went to Dr. Flores's office today for evaluation and was sent in to the ED. Pt states he has been having a lot of burping over this time w/ decreased flatus. Endorses small yellow diarrhea. Minimal PO intake. CT shows perforated appendicitis w/ 6x6x6 cm abscess and small amount of fluid in the pelvis. Admitted for perforated appendicitis w. abscess and small fluid in pelvis. S/p IR drainage 4/20.     IR drain OSCAR x1   CLD/IVF  CTX/FLG  pain/nausea prn   OOB/IS/SCDs    32M w/ no PMHx or PSHx presents for 3 days of RLQ abd pain. Pt states his pain began after lunch 3 days ago that he describes as sharp pain, worse with movement. He states his pain has not been resolving and he went to Dr. Flores's office today for evaluation and was sent in to the ED. Pt states he has been having a lot of burping over this time w/ decreased flatus. Endorses small yellow diarrhea. Minimal PO intake. CT shows perforated appendicitis w/ 6x6x6 cm abscess and small amount of fluid in the pelvis. Admitted for perforated appendicitis w. abscess and small fluid in pelvis. S/p IR drainage 4/20.     IR drain OSCAR x1   CLD/IVF  CTX/FLG  pain/nausea prn   OOB/IS/SCDs   Discharge today

## 2023-04-23 NOTE — DISCHARGE NOTE PROVIDER - NSDCMRMEDTOKEN_GEN_ALL_CORE_FT
cefpodoxime 200 mg oral tablet: 1 tab(s) orally every 12 hours  metroNIDAZOLE 500 mg oral tablet: 1 tab(s) orally every 8 hours

## 2023-04-23 NOTE — DISCHARGE NOTE PROVIDER - NSDCFUADDINST_GEN_ALL_CORE_FT
Please follow up with Dr. Flores in 1 week. Please call his office to make an appointment.   You will be discharged on oral antibiotics as below:   Cefpodoxime 400 mg PO twice a day   Flagyl 500 mg PO   OSCAR Drain Care:  *Please look at the site every day for signs of infection (increased redness or pain, swelling, odor, yellow or bloody discharge, warm to touch, fever).  *Maintain suction of the bulb.  *Note color, consistency, and amount of fluid in the drain. Call the doctor, nurse practitioner, or VNA nurse if the amount increases significantly or changes in character.  *Be sure to empty the drain frequently. Record the output, if instructed to do so.  *You may shower; wash the area gently with warm, soapy water.  *Keep the insertion site clean and dry otherwise.  *Avoid swimming, baths, hot tubs; do not submerge yourself in water.  *Make sure to keep the drain attached securely to your body to prevent pulling or dislocation.   Please follow up with Dr. Flores in 1 week. Please call his office to make an appointment.   You will be discharged on oral antibiotics: Cefpodoxime 400 mg PO every 12 hours and Flagyl 500 mg PO every 8 hours. You will take these antibiotics for 4 days.   For pain management, you may take Tylenol as needed. Please do not take more than 4000 mg within 24 hours.     You will be going home with the OSCAR drain. Below are instructions on how to take care of OSCAR drain care:   *Please look at the site every day for signs of infection (increased redness or pain, swelling, odor, yellow or bloody discharge, warm to touch, fever).  *Maintain suction of the bulb.  *Note color, consistency, and amount of fluid in the drain. Call the doctor, nurse practitioner, or VNA nurse if the amount increases significantly or changes in character.  *Be sure to empty the drain frequently. Record the output, if instructed to do so.  *You may shower; wash the area gently with warm, soapy water.  *Keep the insertion site clean and dry otherwise.  *Avoid swimming, baths, hot tubs; do not submerge yourself in water.  *Make sure to keep the drain attached securely to your body to prevent pulling or dislocation.

## 2023-04-25 LAB
CULTURE RESULTS: NO GROWTH — SIGNIFICANT CHANGE UP
SPECIMEN SOURCE: SIGNIFICANT CHANGE UP

## 2023-04-27 DIAGNOSIS — R55 SYNCOPE AND COLLAPSE: ICD-10-CM

## 2023-04-27 DIAGNOSIS — K35.33 ACUTE APPENDICITIS WITH PERFORATION, LOCALIZED PERITONITIS, AND GANGRENE, WITH ABSCESS: ICD-10-CM

## 2023-04-27 DIAGNOSIS — K35.32 ACUTE APPENDICITIS WITH PERFORATION, LOCALIZED PERITONITIS, AND GANGRENE, WITHOUT ABSCESS: ICD-10-CM

## 2023-04-28 PROBLEM — Z00.00 ENCOUNTER FOR PREVENTIVE HEALTH EXAMINATION: Status: ACTIVE | Noted: 2023-04-28

## 2023-05-10 ENCOUNTER — APPOINTMENT (OUTPATIENT)
Dept: INTERVENTIONAL RADIOLOGY/VASCULAR | Facility: HOSPITAL | Age: 33
End: 2023-05-10

## 2023-05-10 ENCOUNTER — APPOINTMENT (OUTPATIENT)
Dept: CT IMAGING | Facility: HOSPITAL | Age: 33
End: 2023-05-10

## 2023-05-10 ENCOUNTER — OUTPATIENT (OUTPATIENT)
Dept: OUTPATIENT SERVICES | Facility: HOSPITAL | Age: 33
LOS: 1 days | End: 2023-05-10
Payer: COMMERCIAL

## 2023-05-10 DIAGNOSIS — K65.1 PERITONEAL ABSCESS: ICD-10-CM

## 2023-05-10 PROCEDURE — 74177 CT ABD & PELVIS W/CONTRAST: CPT | Mod: 26

## 2023-05-10 PROCEDURE — 74177 CT ABD & PELVIS W/CONTRAST: CPT

## 2023-05-13 NOTE — PATIENT PROFILE ADULT - FUNCTIONAL ASSESSMENT - BASIC MOBILITY 1.
[facility]    Procedure: Insert Emergency Endotracheal Airway - Endotracheal Intubation  (CPT:94424)      Indication Encephalopathy  Consent Verbal consent from patient and wife     Airway Assessment   Mallampati: II  TM distance:  Adequate  Mouth opening: Adequate  Personal Protective Equipment  ACCS PPE: Face Shield and Gloves  Medications   Sedation:   Ativan and Etomidate   Analgesics:  None   Muscle relaxant: Rocuronium  Equipment    Blade: Videolaryngoscope - D-blade   Tube: Size 8.0 ETT  Oxygen by optiflow   View    Grade II   Attempts: 1     Position Secured      26 cm at the Lip   Confirmation of placement   Chest rise, Bilateral breath sounds and End-tidal CO2 detection (ETCO2)  CXR     ETT (endotracheal tube) too low, right mainstem, pulled back 4 cm and repeat CXR improved   Hemodynamics   Stable throughout procedure  Oxygenation    Transient hypoxemia during procedure  Complications   None immediately observed  Difficult Airway   No  Attestation    I performed the entire procedure.   4 = No assist / stand by assistance

## 2023-06-21 ENCOUNTER — INPATIENT (INPATIENT)
Facility: HOSPITAL | Age: 33
LOS: 0 days | Discharge: HOME | DRG: 395 | End: 2023-06-22
Attending: COLON & RECTAL SURGERY | Admitting: COLON & RECTAL SURGERY
Payer: COMMERCIAL

## 2023-06-21 VITALS
HEART RATE: 89 BPM | WEIGHT: 190.04 LBS | HEIGHT: 69 IN | TEMPERATURE: 99 F | DIASTOLIC BLOOD PRESSURE: 78 MMHG | OXYGEN SATURATION: 98 % | SYSTOLIC BLOOD PRESSURE: 111 MMHG | RESPIRATION RATE: 18 BRPM

## 2023-06-21 LAB
ALBUMIN SERPL ELPH-MCNC: 4.4 G/DL — SIGNIFICANT CHANGE UP (ref 3.3–5)
ALP SERPL-CCNC: 91 U/L — SIGNIFICANT CHANGE UP (ref 40–120)
ALT FLD-CCNC: 51 U/L — HIGH (ref 10–45)
ANION GAP SERPL CALC-SCNC: 10 MMOL/L — SIGNIFICANT CHANGE UP (ref 5–17)
APPEARANCE UR: CLEAR — SIGNIFICANT CHANGE UP
AST SERPL-CCNC: 24 U/L — SIGNIFICANT CHANGE UP (ref 10–40)
BASOPHILS # BLD AUTO: 0.02 K/UL — SIGNIFICANT CHANGE UP (ref 0–0.2)
BASOPHILS NFR BLD AUTO: 0.3 % — SIGNIFICANT CHANGE UP (ref 0–2)
BILIRUB SERPL-MCNC: 1.5 MG/DL — HIGH (ref 0.2–1.2)
BILIRUB UR-MCNC: NEGATIVE — SIGNIFICANT CHANGE UP
BUN SERPL-MCNC: 9 MG/DL — SIGNIFICANT CHANGE UP (ref 7–23)
CALCIUM SERPL-MCNC: 9.8 MG/DL — SIGNIFICANT CHANGE UP (ref 8.4–10.5)
CHLORIDE SERPL-SCNC: 103 MMOL/L — SIGNIFICANT CHANGE UP (ref 96–108)
CO2 SERPL-SCNC: 28 MMOL/L — SIGNIFICANT CHANGE UP (ref 22–31)
COLOR SPEC: YELLOW — SIGNIFICANT CHANGE UP
CREAT SERPL-MCNC: 0.84 MG/DL — SIGNIFICANT CHANGE UP (ref 0.5–1.3)
DIFF PNL FLD: NEGATIVE — SIGNIFICANT CHANGE UP
EGFR: 117 ML/MIN/1.73M2 — SIGNIFICANT CHANGE UP
EOSINOPHIL # BLD AUTO: 0.08 K/UL — SIGNIFICANT CHANGE UP (ref 0–0.5)
EOSINOPHIL NFR BLD AUTO: 1.3 % — SIGNIFICANT CHANGE UP (ref 0–6)
GLUCOSE SERPL-MCNC: 100 MG/DL — HIGH (ref 70–99)
GLUCOSE UR QL: NEGATIVE — SIGNIFICANT CHANGE UP
HCT VFR BLD CALC: 42.8 % — SIGNIFICANT CHANGE UP (ref 39–50)
HGB BLD-MCNC: 14.8 G/DL — SIGNIFICANT CHANGE UP (ref 13–17)
IMM GRANULOCYTES NFR BLD AUTO: 0.3 % — SIGNIFICANT CHANGE UP (ref 0–0.9)
KETONES UR-MCNC: NEGATIVE — SIGNIFICANT CHANGE UP
LEUKOCYTE ESTERASE UR-ACNC: NEGATIVE — SIGNIFICANT CHANGE UP
LIDOCAIN IGE QN: 25 U/L — SIGNIFICANT CHANGE UP (ref 7–60)
LYMPHOCYTES # BLD AUTO: 1.6 K/UL — SIGNIFICANT CHANGE UP (ref 1–3.3)
LYMPHOCYTES # BLD AUTO: 25.8 % — SIGNIFICANT CHANGE UP (ref 13–44)
MCHC RBC-ENTMCNC: 30.5 PG — SIGNIFICANT CHANGE UP (ref 27–34)
MCHC RBC-ENTMCNC: 34.6 GM/DL — SIGNIFICANT CHANGE UP (ref 32–36)
MCV RBC AUTO: 88.1 FL — SIGNIFICANT CHANGE UP (ref 80–100)
MONOCYTES # BLD AUTO: 0.62 K/UL — SIGNIFICANT CHANGE UP (ref 0–0.9)
MONOCYTES NFR BLD AUTO: 10 % — SIGNIFICANT CHANGE UP (ref 2–14)
NEUTROPHILS # BLD AUTO: 3.86 K/UL — SIGNIFICANT CHANGE UP (ref 1.8–7.4)
NEUTROPHILS NFR BLD AUTO: 62.3 % — SIGNIFICANT CHANGE UP (ref 43–77)
NITRITE UR-MCNC: NEGATIVE — SIGNIFICANT CHANGE UP
NRBC # BLD: 0 /100 WBCS — SIGNIFICANT CHANGE UP (ref 0–0)
PH UR: 7 — SIGNIFICANT CHANGE UP (ref 5–8)
PLATELET # BLD AUTO: 276 K/UL — SIGNIFICANT CHANGE UP (ref 150–400)
POTASSIUM SERPL-MCNC: 4.1 MMOL/L — SIGNIFICANT CHANGE UP (ref 3.5–5.3)
POTASSIUM SERPL-SCNC: 4.1 MMOL/L — SIGNIFICANT CHANGE UP (ref 3.5–5.3)
PROT SERPL-MCNC: 7.7 G/DL — SIGNIFICANT CHANGE UP (ref 6–8.3)
PROT UR-MCNC: NEGATIVE MG/DL — SIGNIFICANT CHANGE UP
RBC # BLD: 4.86 M/UL — SIGNIFICANT CHANGE UP (ref 4.2–5.8)
RBC # FLD: 13.2 % — SIGNIFICANT CHANGE UP (ref 10.3–14.5)
SODIUM SERPL-SCNC: 141 MMOL/L — SIGNIFICANT CHANGE UP (ref 135–145)
SP GR SPEC: 1.01 — SIGNIFICANT CHANGE UP (ref 1–1.03)
UROBILINOGEN FLD QL: 0.2 E.U./DL — SIGNIFICANT CHANGE UP
WBC # BLD: 6.2 K/UL — SIGNIFICANT CHANGE UP (ref 3.8–10.5)
WBC # FLD AUTO: 6.2 K/UL — SIGNIFICANT CHANGE UP (ref 3.8–10.5)

## 2023-06-21 PROCEDURE — 74177 CT ABD & PELVIS W/CONTRAST: CPT | Mod: 26,MA

## 2023-06-21 PROCEDURE — 99285 EMERGENCY DEPT VISIT HI MDM: CPT

## 2023-06-21 RX ORDER — OXYCODONE HYDROCHLORIDE 5 MG/1
5 TABLET ORAL EVERY 6 HOURS
Refills: 0 | Status: DISCONTINUED | OUTPATIENT
Start: 2023-06-21 | End: 2023-06-22

## 2023-06-21 RX ORDER — METRONIDAZOLE 500 MG
500 TABLET ORAL ONCE
Refills: 0 | Status: COMPLETED | OUTPATIENT
Start: 2023-06-21 | End: 2023-06-21

## 2023-06-21 RX ORDER — CEFTRIAXONE 500 MG/1
1000 INJECTION, POWDER, FOR SOLUTION INTRAMUSCULAR; INTRAVENOUS EVERY 24 HOURS
Refills: 0 | Status: DISCONTINUED | OUTPATIENT
Start: 2023-06-22 | End: 2023-06-22

## 2023-06-21 RX ORDER — ACETAMINOPHEN 500 MG
1000 TABLET ORAL ONCE
Refills: 0 | Status: DISCONTINUED | OUTPATIENT
Start: 2023-06-21 | End: 2023-06-22

## 2023-06-21 RX ORDER — HEPARIN SODIUM 5000 [USP'U]/ML
5000 INJECTION INTRAVENOUS; SUBCUTANEOUS EVERY 8 HOURS
Refills: 0 | Status: DISCONTINUED | OUTPATIENT
Start: 2023-06-21 | End: 2023-06-22

## 2023-06-21 RX ORDER — HYDROMORPHONE HYDROCHLORIDE 2 MG/ML
0.5 INJECTION INTRAMUSCULAR; INTRAVENOUS; SUBCUTANEOUS EVERY 4 HOURS
Refills: 0 | Status: DISCONTINUED | OUTPATIENT
Start: 2023-06-21 | End: 2023-06-21

## 2023-06-21 RX ORDER — SODIUM CHLORIDE 9 MG/ML
1000 INJECTION, SOLUTION INTRAVENOUS
Refills: 0 | Status: DISCONTINUED | OUTPATIENT
Start: 2023-06-21 | End: 2023-06-22

## 2023-06-21 RX ORDER — SODIUM CHLORIDE 9 MG/ML
1000 INJECTION INTRAMUSCULAR; INTRAVENOUS; SUBCUTANEOUS ONCE
Refills: 0 | Status: COMPLETED | OUTPATIENT
Start: 2023-06-21 | End: 2023-06-21

## 2023-06-21 RX ORDER — MORPHINE SULFATE 50 MG/1
4 CAPSULE, EXTENDED RELEASE ORAL ONCE
Refills: 0 | Status: DISCONTINUED | OUTPATIENT
Start: 2023-06-21 | End: 2023-06-21

## 2023-06-21 RX ORDER — ONDANSETRON 8 MG/1
4 TABLET, FILM COATED ORAL EVERY 6 HOURS
Refills: 0 | Status: DISCONTINUED | OUTPATIENT
Start: 2023-06-21 | End: 2023-06-22

## 2023-06-21 RX ORDER — IOHEXOL 300 MG/ML
30 INJECTION, SOLUTION INTRAVENOUS ONCE
Refills: 0 | Status: COMPLETED | OUTPATIENT
Start: 2023-06-21 | End: 2023-06-21

## 2023-06-21 RX ORDER — CEFTRIAXONE 500 MG/1
1000 INJECTION, POWDER, FOR SOLUTION INTRAMUSCULAR; INTRAVENOUS ONCE
Refills: 0 | Status: COMPLETED | OUTPATIENT
Start: 2023-06-21 | End: 2023-06-21

## 2023-06-21 RX ORDER — ONDANSETRON 8 MG/1
4 TABLET, FILM COATED ORAL ONCE
Refills: 0 | Status: COMPLETED | OUTPATIENT
Start: 2023-06-21 | End: 2023-06-21

## 2023-06-21 RX ORDER — METRONIDAZOLE 500 MG
500 TABLET ORAL EVERY 8 HOURS
Refills: 0 | Status: DISCONTINUED | OUTPATIENT
Start: 2023-06-21 | End: 2023-06-22

## 2023-06-21 RX ADMIN — HEPARIN SODIUM 5000 UNIT(S): 5000 INJECTION INTRAVENOUS; SUBCUTANEOUS at 21:01

## 2023-06-21 RX ADMIN — SODIUM CHLORIDE 120 MILLILITER(S): 9 INJECTION, SOLUTION INTRAVENOUS at 21:01

## 2023-06-21 RX ADMIN — SODIUM CHLORIDE 1000 MILLILITER(S): 9 INJECTION INTRAMUSCULAR; INTRAVENOUS; SUBCUTANEOUS at 16:36

## 2023-06-21 RX ADMIN — Medication 100 MILLIGRAM(S): at 15:19

## 2023-06-21 RX ADMIN — Medication 100 MILLIGRAM(S): at 23:13

## 2023-06-21 RX ADMIN — CEFTRIAXONE 100 MILLIGRAM(S): 500 INJECTION, POWDER, FOR SOLUTION INTRAMUSCULAR; INTRAVENOUS at 14:40

## 2023-06-21 RX ADMIN — IOHEXOL 30 MILLILITER(S): 300 INJECTION, SOLUTION INTRAVENOUS at 11:18

## 2023-06-21 RX ADMIN — SODIUM CHLORIDE 120 MILLILITER(S): 9 INJECTION, SOLUTION INTRAVENOUS at 18:43

## 2023-06-21 RX ADMIN — SODIUM CHLORIDE 1000 MILLILITER(S): 9 INJECTION INTRAMUSCULAR; INTRAVENOUS; SUBCUTANEOUS at 11:17

## 2023-06-21 NOTE — ED ADULT NURSE NOTE - OBJECTIVE STATEMENT
Pt presents c/o RLQ abdominal pain. Pt reports that he was here 2 months ago for "almost burst appendix." No surgery done, drain was placed, since discontinued. Pt is back because he's experiencing the similar pain. Pt is A+Ox4, denies chest pain or respiratory distress. Reports pain started yesterday, he was nauseous, no emesis. Pain is 9/10 on palpation. has not taken any medications for pain PYA. Denies fever, chills or diarrhea, or hematuria.

## 2023-06-21 NOTE — ED ADULT NURSE REASSESSMENT NOTE - NS ED NURSE REASSESS COMMENT FT1
Patient /aoX3, declines need for pain or anti-nausea meds.  Vital signs stable.  Metronidazole IVPB completed, no adverse rxn.  NPO observed.  For surgery admit.
Received patient care and endorsement from previous RN.  Patient a/oX 4, c/o of RLQ abdominal pain, Hx of appendicitis 2 months ago, OSCAR drain done then then discontinued.  Patient returns due to return of RLQ abdominal pain, with nausea/vomitting, no diarrhea, no fever/chills.  Vital signs stble.  PIV #18 to Right AC, all labs sent.  NSS 1 L bolus ongoing.  IV ABT to be administered.  Surgery admit.  NPO observed.
Surgery consult at chair side assessing pt.
Patient /aoX 3, no abdominal or any symptom complaint, clear liquid diet tolerated well.  Vital signs stable. LR ongoing 120ml/hr.  For 9 uris admit.  Transferred in stable condition.

## 2023-06-21 NOTE — ED PROVIDER NOTE - CLINICAL SUMMARY MEDICAL DECISION MAKING FREE TEXT BOX
33 year old male s/p perforated appendicitis with 6x6x6 cm abscess and small amount of fluid in the pelvis in April s/p  IR drainage with a 10 Fr OSCAR in place on 4/20 complaining of right lower quadrant pain that started yesterday.  Associated with increased belching and nausea.  Symptoms feel similar to when he had the perforated appendicitis.   Denies fever, chills, vomiting, dysuria, hematuria, back pain. VSS. +RLQ ttp, no rebound or guarding. Labs, CT

## 2023-06-21 NOTE — ED PROVIDER NOTE - ATTENDING APP SHARED VISIT CONTRIBUTION OF CARE
Vitals wnl, exam as above. Bili 1.5 grossly at baseline, ALT 51, other labs grossly wnl. UA no infection. CT showed "1.  Ppersistent [sic] either complex collection or low-attenuation cecal wall thickening with collection, in the right lower quadrant. Cannot exclude abscess. 2.  Trace free fluid in the pelvis, slightly increased." Surgery consulted, will give abx, admit for further care.

## 2023-06-21 NOTE — H&P ADULT - NSHPLABSRESULTS_GEN_ALL_CORE
LABS:                        14.8   6.20  )-----------( 276      ( 21 Jun 2023 10:53 )             42.8     06-21    141  |  103  |  9   ----------------------------<  100<H>  4.1   |  28  |  0.84    Ca    9.8      21 Jun 2023 10:53    TPro  7.7  /  Alb  4.4  /  TBili  1.5<H>  /  DBili  x   /  AST  24  /  ALT  51<H>  /  AlkPhos  91  06-21    CT AP:    LOWER CHEST: Within normal limits.    LIVER: Within normal limits.  BILE DUCTS: Normal caliber.  GALLBLADDER: Within normal limits.  SPLEEN: Within normal limits.  PANCREAS: Within normal limits.  ADRENALS: Within normal limits.  KIDNEYS/URETERS: Symmetric enhancement. No collecting system obstruction   bilaterally.    BLADDER: Within normal limits.  REPRODUCTIVE ORGANS: Prostate within normal limits.    BOWEL: No small bowel obstruction. No areas of abnormal small bowel   thickening. Normal appendix is not visualized.An indeterminate,   approximately 4.5 x 5.5 cm lesion/complex collection is seen in the right   lower quadrant. Interval removal of previous noted right sided pigtail   drainage catheter from this collection/region. Scattered internal   calcifications in a stable configuration are noted in this lesion.  PERITONEUM: Trace pelvic free fluid, increased  VESSELS: Within normal limits.  RETROPERITONEUM/LYMPH NODES: No lymphadenopathy.  ABDOMINAL WALL: Within normal limits.  BONES: Within normal limits.    IMPRESSION:  1.  Persistent either complex collection or low-attenuation cecal wall   thickening with collection, in the right lower quadrant. Cannot exclude   abscess.  2.  Trace free fluid in the pelvis, slightly increased.

## 2023-06-21 NOTE — ED ADULT TRIAGE NOTE - CHIEF COMPLAINT QUOTE
Pt co RLQ pain with nausea x1 day. Pt hospitalized @ end of April 2023 d.t perforated appendix with abscess and had OSCAR drain placed. Denies vomiting, diarrhea, f/c.

## 2023-06-21 NOTE — ED ADULT NURSE NOTE - CAS EDN DISCHARGE ASSESSMENT
Alert and oriented to person, place and time/Awake Alert and oriented to person, place and time/Awake/Symptoms improved

## 2023-06-21 NOTE — H&P ADULT - ASSESSMENT
33M w/ recent admission (4/19-4/23/23) for perforated appendicitis determined to be possible mucocele (discharged w/ abx s/p IR drainage) here for recurrent RLQ pain likely secondary to cecal mass w/ stable CT scan.    CLD  CTX/FLG  Pain/nausea PRN  OOBA/IS  SCDs/SQH  ANDRES  Admit regional, team 1, Dr. Flores

## 2023-06-21 NOTE — ED ADULT NURSE NOTE - CHPI ED NUR SYMPTOMS POS
Patient Quality Outreach Summary      Summary:    Patient is due/failing the following:   Well Child Visit    Type of outreach:    Sent Caro Nutt message.    Questions for provider review:    None                                                                                                                    Pretty Westfall CMA       Chart routed to Care Team.     NAUSEA/PAIN/TENDERNESS

## 2023-06-21 NOTE — ED ADULT NURSE REASSESSMENT NOTE - NSFALLUNIVINTERV_ED_ALL_ED
Bed/Stretcher in lowest position, wheels locked, appropriate side rails in place/Call bell, personal items and telephone in reach/Instruct patient to call for assistance before getting out of bed/chair/stretcher/Non-slip footwear applied when patient is off stretcher/[place] to call system/Physically safe environment - no spills, clutter or unnecessary equipment/Purposeful proactive rounding/Room/bathroom lighting operational, light cord in reach

## 2023-06-21 NOTE — H&P ADULT - HISTORY OF PRESENT ILLNESS
33M w/ PMHx of recent admission (4/19-4/23/23) for perforated appendicitis determined to be possible mucocele (discharged w/ abx s/p IR drainage), no PSHx presents from Dr. Flores's offce today w/ recurrent abdominal pain since last night. He describes his pain as constant, throbbing pain that feels exactly like his previous appendicitis episode. Endorses mild nausea w/o vomiting. Denies f/c. Denies c/d. Denies cp/sob. Denies urinary or bowel complaints. Denies PO tolerance.    Interval CT performed on 5/10/23 showed "Complex mass containing radiodensities i.e. calcifications seen contiguous with the inferior pole of cecum. 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 Labs significant for WBC 6.2. Hgb 14.8. CT scan showed persistent complex collection but cannot exclude abscess.    PMHx: Perforated appy vs mucocele 4/19/23 s/p IR and abx  PSHx: Denies  Social Hx: Social EtOH  Family Hx: Denies family hx of IBS, Crohn's or colon cancer. Mother has UC.  Last Cscope: Denies  Last EGD: Denies  All: No Known Allergies  Meds: Allegra    Vitals past 24h:  T(F): 98.7 (10:42), Max: 98.7 (10:42)  HR: 89 (10:42) (89 - 89)  BP: 111/78 (10:42) (111/78 - 111/78)  RR: 18 (10:42) (18 - 18)  SpO2: 98% (10:42) (98% - 98%)

## 2023-06-21 NOTE — PATIENT PROFILE ADULT - FALL HARM RISK - UNIVERSAL INTERVENTIONS
Bed in lowest position, wheels locked, appropriate side rails in place/Call bell, personal items and telephone in reach/Instruct patient to call for assistance before getting out of bed or chair/Non-slip footwear when patient is out of bed/[place] to call system/Physically safe environment - no spills, clutter or unnecessary equipment/Purposeful Proactive Rounding/Room/bathroom lighting operational, light cord in reach

## 2023-06-21 NOTE — H&P ADULT - NSHPPHYSICALEXAM_GEN_ALL_CORE
Physical Exam  General: NAD, laying comfortably in bed  Cardio: S1,S2, No MRG, RRR  Pulm: No respiratory distress, breathing comfortably on room air  Abdomen: soft, mild RLQ TTP, nondistended  Extremities: WWP, peripheral pulses appreciated  Neuro: CNII-XII grossly intact, no gross motor deficits  Psych: AAOx3, pleasant

## 2023-06-21 NOTE — ED PROVIDER NOTE - OBJECTIVE STATEMENT
33 year old male s/p perforated appendicitis with 6x6x6 cm abscess and small amount of fluid in the pelvis in April s/p  IR drainage with a 10 Fr OSCAR in place on 4/20 complaining of right lower quadrant pain that started yesterday.  Associated with increased belching and nausea.  Symptoms feel similar to when he had the perforated appendicitis.  Patient saw his surgeon today who referred him to the ER.  Denies fever, chills, vomiting, dysuria, hematuria, back pain

## 2023-06-21 NOTE — ED ADULT NURSE NOTE - NSFALLUNIVINTERV_ED_ALL_ED
Bed/Stretcher in lowest position, wheels locked, appropriate side rails in place/Call bell, personal items and telephone in reach/Instruct patient to call for assistance before getting out of bed/chair/stretcher/Non-slip footwear applied when patient is off stretcher/[place] to call system/Physically safe environment - no spills, clutter or unnecessary equipment/Purposeful proactive rounding/Room/bathroom lighting operational, light cord in reach

## 2023-06-22 ENCOUNTER — TRANSCRIPTION ENCOUNTER (OUTPATIENT)
Age: 33
End: 2023-06-22

## 2023-06-22 VITALS
OXYGEN SATURATION: 97 % | RESPIRATION RATE: 18 BRPM | DIASTOLIC BLOOD PRESSURE: 78 MMHG | TEMPERATURE: 98 F | SYSTOLIC BLOOD PRESSURE: 113 MMHG | HEART RATE: 84 BPM

## 2023-06-22 LAB
ANION GAP SERPL CALC-SCNC: 8 MMOL/L — SIGNIFICANT CHANGE UP (ref 5–17)
APTT BLD: 34.3 SEC — SIGNIFICANT CHANGE UP (ref 27.5–35.5)
BUN SERPL-MCNC: 7 MG/DL — SIGNIFICANT CHANGE UP (ref 7–23)
CALCIUM SERPL-MCNC: 9.4 MG/DL — SIGNIFICANT CHANGE UP (ref 8.4–10.5)
CHLORIDE SERPL-SCNC: 102 MMOL/L — SIGNIFICANT CHANGE UP (ref 96–108)
CO2 SERPL-SCNC: 27 MMOL/L — SIGNIFICANT CHANGE UP (ref 22–31)
CREAT SERPL-MCNC: 0.88 MG/DL — SIGNIFICANT CHANGE UP (ref 0.5–1.3)
CULTURE RESULTS: NO GROWTH — SIGNIFICANT CHANGE UP
EGFR: 116 ML/MIN/1.73M2 — SIGNIFICANT CHANGE UP
GLUCOSE SERPL-MCNC: 98 MG/DL — SIGNIFICANT CHANGE UP (ref 70–99)
HCT VFR BLD CALC: 42.3 % — SIGNIFICANT CHANGE UP (ref 39–50)
HGB BLD-MCNC: 14.4 G/DL — SIGNIFICANT CHANGE UP (ref 13–17)
INR BLD: 1.04 — SIGNIFICANT CHANGE UP (ref 0.88–1.16)
MAGNESIUM SERPL-MCNC: 2.1 MG/DL — SIGNIFICANT CHANGE UP (ref 1.6–2.6)
MCHC RBC-ENTMCNC: 30.7 PG — SIGNIFICANT CHANGE UP (ref 27–34)
MCHC RBC-ENTMCNC: 34 GM/DL — SIGNIFICANT CHANGE UP (ref 32–36)
MCV RBC AUTO: 90.2 FL — SIGNIFICANT CHANGE UP (ref 80–100)
NRBC # BLD: 0 /100 WBCS — SIGNIFICANT CHANGE UP (ref 0–0)
PHOSPHATE SERPL-MCNC: 3.1 MG/DL — SIGNIFICANT CHANGE UP (ref 2.5–4.5)
PLATELET # BLD AUTO: 263 K/UL — SIGNIFICANT CHANGE UP (ref 150–400)
POTASSIUM SERPL-MCNC: 4.1 MMOL/L — SIGNIFICANT CHANGE UP (ref 3.5–5.3)
POTASSIUM SERPL-SCNC: 4.1 MMOL/L — SIGNIFICANT CHANGE UP (ref 3.5–5.3)
PROTHROM AB SERPL-ACNC: 12.4 SEC — SIGNIFICANT CHANGE UP (ref 10.5–13.4)
RBC # BLD: 4.69 M/UL — SIGNIFICANT CHANGE UP (ref 4.2–5.8)
RBC # FLD: 13.2 % — SIGNIFICANT CHANGE UP (ref 10.3–14.5)
SODIUM SERPL-SCNC: 137 MMOL/L — SIGNIFICANT CHANGE UP (ref 135–145)
SPECIMEN SOURCE: SIGNIFICANT CHANGE UP
WBC # BLD: 4.93 K/UL — SIGNIFICANT CHANGE UP (ref 3.8–10.5)
WBC # FLD AUTO: 4.93 K/UL — SIGNIFICANT CHANGE UP (ref 3.8–10.5)

## 2023-06-22 PROCEDURE — 85027 COMPLETE CBC AUTOMATED: CPT

## 2023-06-22 PROCEDURE — 85730 THROMBOPLASTIN TIME PARTIAL: CPT

## 2023-06-22 PROCEDURE — 74177 CT ABD & PELVIS W/CONTRAST: CPT | Mod: MA

## 2023-06-22 PROCEDURE — 83690 ASSAY OF LIPASE: CPT

## 2023-06-22 PROCEDURE — 87086 URINE CULTURE/COLONY COUNT: CPT

## 2023-06-22 PROCEDURE — 96374 THER/PROPH/DIAG INJ IV PUSH: CPT

## 2023-06-22 PROCEDURE — 82247 BILIRUBIN TOTAL: CPT

## 2023-06-22 PROCEDURE — 82248 BILIRUBIN DIRECT: CPT

## 2023-06-22 PROCEDURE — 96361 HYDRATE IV INFUSION ADD-ON: CPT

## 2023-06-22 PROCEDURE — 96375 TX/PRO/DX INJ NEW DRUG ADDON: CPT

## 2023-06-22 PROCEDURE — 81003 URINALYSIS AUTO W/O SCOPE: CPT

## 2023-06-22 PROCEDURE — 36415 COLL VENOUS BLD VENIPUNCTURE: CPT

## 2023-06-22 PROCEDURE — 99285 EMERGENCY DEPT VISIT HI MDM: CPT

## 2023-06-22 PROCEDURE — 71045 X-RAY EXAM CHEST 1 VIEW: CPT | Mod: 26

## 2023-06-22 PROCEDURE — 80053 COMPREHEN METABOLIC PANEL: CPT

## 2023-06-22 PROCEDURE — 80048 BASIC METABOLIC PNL TOTAL CA: CPT

## 2023-06-22 PROCEDURE — 84100 ASSAY OF PHOSPHORUS: CPT

## 2023-06-22 PROCEDURE — 85610 PROTHROMBIN TIME: CPT

## 2023-06-22 PROCEDURE — 85025 COMPLETE CBC W/AUTO DIFF WBC: CPT

## 2023-06-22 PROCEDURE — 71045 X-RAY EXAM CHEST 1 VIEW: CPT

## 2023-06-22 PROCEDURE — 83735 ASSAY OF MAGNESIUM: CPT

## 2023-06-22 RX ORDER — METRONIDAZOLE 500 MG
1 TABLET ORAL
Qty: 12 | Refills: 0
Start: 2023-06-22 | End: 2023-06-25

## 2023-06-22 RX ORDER — CEFPODOXIME PROXETIL 100 MG
1 TABLET ORAL
Qty: 8 | Refills: 0
Start: 2023-06-22 | End: 2023-06-25

## 2023-06-22 RX ORDER — OXYCODONE HYDROCHLORIDE 5 MG/1
1 TABLET ORAL
Qty: 12 | Refills: 0
Start: 2023-06-22 | End: 2023-06-24

## 2023-06-22 RX ADMIN — HEPARIN SODIUM 5000 UNIT(S): 5000 INJECTION INTRAVENOUS; SUBCUTANEOUS at 05:05

## 2023-06-22 RX ADMIN — Medication 100 MILLIGRAM(S): at 06:40

## 2023-06-22 RX ADMIN — Medication 100 MILLIGRAM(S): at 14:07

## 2023-06-22 RX ADMIN — CEFTRIAXONE 100 MILLIGRAM(S): 500 INJECTION, POWDER, FOR SOLUTION INTRAMUSCULAR; INTRAVENOUS at 14:05

## 2023-06-22 RX ADMIN — HEPARIN SODIUM 5000 UNIT(S): 5000 INJECTION INTRAVENOUS; SUBCUTANEOUS at 14:05

## 2023-06-22 NOTE — CONSULT NOTE ADULT - SUBJECTIVE AND OBJECTIVE BOX
33M w/ PMHx of recent admission (4/19-4/23/23) for perforated appendicitis determined to be possible mucocele (discharged w/ abx s/p IR drainage), no PSHx presented from Dr Flores for to reccurent episodes of appendicitis which etiology maybe due to complex masses, which clincially appears as continuous pains similar to last episode with associated nausea.         Interval CT performed on 5/10/23 showed "Complex mass containing radiodensities i.e. calcifications seen contiguous with the inferior pole of cecum. 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    PMHx: Perforated appy vs mucocele 4/19/23 s/p IR and abx  PSHx: Denies  Social Hx: Social EtOH  Family Hx: Denies family hx of IBS, Crohn's or colon cancer. Mother has UC.  Last Cscope: Denies  Last EGD: Denies  All: No Known Allergies  Meds: Allegra    Vitals past 24h:  T(F): 98.7 (10:42), Max: 98.7 (10:42)  HR: 89 (10:42) (89 - 89)  BP: 111/78 (10:42) (111/78 - 111/78)  RR: 18 (10:42) (18 - 18)  SpO2: 98% (10:42) (98% - 98%)   (21 Jun 2023 13:54)        PAST MEDICAL & SURGICAL HISTORY:  Perforated appendicitis      No significant past surgical history          Allergies    No Known Allergies    Intolerances        MEDICATIONS  (STANDING):  cefTRIAXone   IVPB 1000 milliGRAM(s) IV Intermittent every 24 hours  heparin   Injectable 5000 Unit(s) SubCutaneous every 8 hours  lactated ringers. 1000 milliLiter(s) (60 mL/Hr) IV Continuous <Continuous>  metroNIDAZOLE  IVPB 500 milliGRAM(s) IV Intermittent every 8 hours    MEDICATIONS  (PRN):  acetaminophen   IVPB .. 1000 milliGRAM(s) IV Intermittent once PRN Mild Pain (1 - 3)  ondansetron Injectable 4 milliGRAM(s) IV Push every 6 hours PRN Nausea  oxyCODONE    IR 5 milliGRAM(s) Oral every 6 hours PRN Moderate Pain (4 - 6)      SOCIAL HISTORY:    FAMILY HISTORY:        Vital Signs Last 24 Hrs  T(C): 36.6 (22 Jun 2023 05:24), Max: 37.1 (21 Jun 2023 10:42)  T(F): 97.9 (22 Jun 2023 05:24), Max: 98.7 (21 Jun 2023 10:42)  HR: 68 (22 Jun 2023 05:24) (68 - 94)  BP: 110/73 (22 Jun 2023 05:24) (102/67 - 122/76)  BP(mean): --  RR: 17 (22 Jun 2023 05:24) (16 - 18)  SpO2: 99% (22 Jun 2023 05:24) (97% - 99%)    Parameters below as of 22 Jun 2023 05:24  Patient On (Oxygen Delivery Method): room air        I&O's Summary    21 Jun 2023 07:01  -  22 Jun 2023 07:00  --------------------------------------------------------  IN: 1400 mL / OUT: 990 mL / NET: 410 mL        LABS:                        14.4   4.93  )-----------( 263      ( 22 Jun 2023 08:00 )             42.3     06-22    137  |  102  |  7   ----------------------------<  98  4.1   |  27  |  0.88    Ca    9.4      22 Jun 2023 08:00  Phos  3.1     06-22  Mg     2.1     06-22    TPro  7.7  /  Alb  4.4  /  TBili  1.5<H>  /  DBili  0.2  /  AST  24  /  ALT  51<H>  /  AlkPhos  91  06-21    LIVER FUNCTIONS - ( 21 Jun 2023 10:53 )  Alb: 4.4 g/dL / Pro: 7.7 g/dL / ALK PHOS: 91 U/L / ALT: 51 U/L / AST: 24 U/L / GGT: x           PT/INR - ( 22 Jun 2023 08:00 )   PT: 12.4 sec;   INR: 1.04          PTT - ( 22 Jun 2023 08:00 )  PTT:34.3 sec  Urinalysis Basic - ( 22 Jun 2023 08:00 )    Color: x / Appearance: x / SG: x / pH: x  Gluc: 98 mg/dL / Ketone: x  / Bili: x / Urobili: x   Blood: x / Protein: x / Nitrite: x   Leuk Esterase: x / RBC: x / WBC x   Sq Epi: x / Non Sq Epi: x / Bacteria: x      CAPILLARY BLOOD GLUCOSE          Cultures:  Culture Results:   No growth (06-21 @ 13:00)      PHYSICAL EXAM:  General: NAD, resting comfortably  HEENT: NC/AT, EOMI, normal hearing, no oral lesions, no LAD, neck supple  Pulmonary: Normal resp effort, CTA-B  Cardiovascular: NSR, no murmurs  Abdominal: Soft, ND/NT, no organomegaly  Groin: Soft, nontender, no ecchymosis/hematoma, no erythema, no edema.  Extremities: (+) DP/PT pulses. FROM, normal strength, no clubbing/cyanosis/erythema/edema  Neuro: A/O x 3, CNs II-XII grossly intact, normal sensation, no focal deficits  Pulses: Palpable distal pulses    RADIOLOGY & ADDITIONAL STUDIES:      ASSESSMENT:      PLAN:           33M w/ PMHx of recent admission (4/19-4/23/23) for perforated appendicitis determined to be possible mucocele (discharged w/ abx s/p IR drainage), no PSHx presented from Dr Flores for to reccurent episodes of appendicitis which etiology maybe due to complex masses, which clincially appears as continuous pains similar to last episode with associated nausea. Patient has good METS > 4 had no issued exercising moderately for up to an hour walking / running, no c/o SOB of CP / Palpitations         Interval CT performed on 5/10/23 showed "Complex mass containing radiodensities i.e. calcifications seen contiguous with the inferior pole of cecum. 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    PMHx: Perforated appy vs mucocele 4/19/23 s/p IR and abx  PSHx: Denies  Social Hx: Social EtOH  Family Hx: Denies family hx of IBS, Crohn's or colon cancer. Mother has UC.  Last Cscope: Denies  Last EGD: Denies  All: No Known Allergies  Meds: Allegra    Vitals past 24h:  T(F): 98.7 (10:42), Max: 98.7 (10:42)  HR: 89 (10:42) (89 - 89)  BP: 111/78 (10:42) (111/78 - 111/78)  RR: 18 (10:42) (18 - 18)  SpO2: 98% (10:42) (98% - 98%)   (21 Jun 2023 13:54)        PAST MEDICAL & SURGICAL HISTORY:  Perforated appendicitis      No significant past surgical history          Allergies    No Known Allergies    Intolerances        MEDICATIONS  (STANDING):  cefTRIAXone   IVPB 1000 milliGRAM(s) IV Intermittent every 24 hours  heparin   Injectable 5000 Unit(s) SubCutaneous every 8 hours  lactated ringers. 1000 milliLiter(s) (60 mL/Hr) IV Continuous <Continuous>  metroNIDAZOLE  IVPB 500 milliGRAM(s) IV Intermittent every 8 hours    MEDICATIONS  (PRN):  acetaminophen   IVPB .. 1000 milliGRAM(s) IV Intermittent once PRN Mild Pain (1 - 3)  ondansetron Injectable 4 milliGRAM(s) IV Push every 6 hours PRN Nausea  oxyCODONE    IR 5 milliGRAM(s) Oral every 6 hours PRN Moderate Pain (4 - 6)      SOCIAL HISTORY:    FAMILY HISTORY:        Vital Signs Last 24 Hrs  T(C): 36.6 (22 Jun 2023 05:24), Max: 37.1 (21 Jun 2023 10:42)  T(F): 97.9 (22 Jun 2023 05:24), Max: 98.7 (21 Jun 2023 10:42)  HR: 68 (22 Jun 2023 05:24) (68 - 94)  BP: 110/73 (22 Jun 2023 05:24) (102/67 - 122/76)  BP(mean): --  RR: 17 (22 Jun 2023 05:24) (16 - 18)  SpO2: 99% (22 Jun 2023 05:24) (97% - 99%)    Parameters below as of 22 Jun 2023 05:24  Patient On (Oxygen Delivery Method): room air        I&O's Summary    21 Jun 2023 07:01  -  22 Jun 2023 07:00  --------------------------------------------------------  IN: 1400 mL / OUT: 990 mL / NET: 410 mL        LABS:                        14.4   4.93  )-----------( 263      ( 22 Jun 2023 08:00 )             42.3     06-22    137  |  102  |  7   ----------------------------<  98  4.1   |  27  |  0.88    Ca    9.4      22 Jun 2023 08:00  Phos  3.1     06-22  Mg     2.1     06-22    TPro  7.7  /  Alb  4.4  /  TBili  1.5<H>  /  DBili  0.2  /  AST  24  /  ALT  51<H>  /  AlkPhos  91  06-21    LIVER FUNCTIONS - ( 21 Jun 2023 10:53 )  Alb: 4.4 g/dL / Pro: 7.7 g/dL / ALK PHOS: 91 U/L / ALT: 51 U/L / AST: 24 U/L / GGT: x           PT/INR - ( 22 Jun 2023 08:00 )   PT: 12.4 sec;   INR: 1.04          PTT - ( 22 Jun 2023 08:00 )  PTT:34.3 sec  Urinalysis Basic - ( 22 Jun 2023 08:00 )    Color: x / Appearance: x / SG: x / pH: x  Gluc: 98 mg/dL / Ketone: x  / Bili: x / Urobili: x   Blood: x / Protein: x / Nitrite: x   Leuk Esterase: x / RBC: x / WBC x   Sq Epi: x / Non Sq Epi: x / Bacteria: x      CAPILLARY BLOOD GLUCOSE          Cultures:  Culture Results:   No growth (06-21 @ 13:00)      PHYSICAL EXAM:  General: NAD, resting comfortably  HEENT: NC/AT, EOMI, normal hearing, no oral lesions, no LAD, neck supple  Pulmonary: Normal resp effort, CTA-B  Cardiovascular: NSR, no murmurs  Abdominal: Soft, ND/NT, no organomegaly  Groin: Soft, nontender, no ecchymosis/hematoma, no erythema, no edema.  Extremities: (+) DP/PT pulses. FROM, normal strength, no clubbing/cyanosis/erythema/edema  Neuro: A/O x 3, CNs II-XII grossly intact, normal sensation, no focal deficits  Pulses: Palpable distal pulses    RADIOLOGY & ADDITIONAL STUDIES:      ASSESSMENT:      PLAN:

## 2023-06-22 NOTE — DISCHARGE NOTE NURSING/CASE MANAGEMENT/SOCIAL WORK - PATIENT PORTAL LINK FT
You can access the FollowMyHealth Patient Portal offered by [facility] by registering at the following website: [email or web address]. By joining [facility]’s FollowMyHealth portal, you will also be able to view your health information using other applications (apps) compatible with our system.

## 2023-06-22 NOTE — DISCHARGE NOTE PROVIDER - CARE PROVIDER_API CALL
Leonard Flores  Colon/Rectal Surgery  [address], [address]  [place], [place] [facility]  Phone: [phone]  Fax: [phone]  Follow Up Time:

## 2023-06-22 NOTE — DISCHARGE NOTE PROVIDER - INSTRUCTIONS
Please continue a low fiber diet until Sunday morning 6/25/23, then switch to a clear liquid diet only. Please be advised you should be NPO (nothing by mouth) after midnight (12AM) on Sunday in preparation of your surgery on Monday 6/26/23.

## 2023-06-22 NOTE — CONSULT NOTE ADULT - ASSESSMENT
33M w/ PMHx of recent admission (4/19-4/23/23) for perforated appendicitis determined to be possible mucocele (discharged w/ abx s/p IR drainage), no PSHx presented from Dr Flores for to reccurent episodes of appendicitis which etiology maybe due to complex masses, which clincially appears as continuous pains similar to last episode with associated nausea.   Interval CT performed on 5/10/23 showed "Complex mass containing radiodensities i.e. calcifications seen contiguous with the inferior pole of cecum. 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    PMHx: Perforated appy vs mucocele 4/19/23 s/p IR and abx  Family Hx: Denies family hx of IBS, Crohn's or colon cancer. Mother has UC.Allergies     33M w/ PMHx of recent admission (4/19-4/23/23) for perforated appendicitis determined to be possible mucocele (discharged w/ abx s/p IR drainage), no PSHx presented from Dr Flores for to recurrent episodes of appendicitis which etiology maybe due to complex masses, which clinically appears as continuous pains similar to last episode with associated nausea.   Interval CT performed on 5/10/23 showed "Complex mass containing radiodensities i.e. calcifications seen contiguous with the inferior pole of cecum. 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    PMHx: Perforated appy vs mucocele 4/19/23 s/p IR and abx  Family Hx: Denies family hx of IBS, Crohn's or colon cancer. Mother has UC.    Patient has good METS ? 4 had no issued exercising moderately for up to an hour walking / running, no c/o SOB of CP / Palpitations   CXR clear EKG to review  patient is low risk for moderate risk procedure

## 2023-06-22 NOTE — DISCHARGE NOTE PROVIDER - NSDCCPCAREPLAN_GEN_ALL_CORE_FT
PRINCIPAL DISCHARGE DIAGNOSIS  Diagnosis: Mucocele of appendix  Assessment and Plan of Treatment:

## 2023-06-22 NOTE — PROGRESS NOTE ADULT - SUBJECTIVE AND OBJECTIVE BOX
admitted with RLQ pain and anorexia  persistent appendiceal/RLQ pathology  AVSS  Normal labs  Abd soft tender in RLQ    Medical clearance  OR Monday  needs bowel Prep  would like to try and allow Abdominal pain to resolve before prepping 
INTERVAL HPI/OVERNIGHT EVENTS: tender R side of abd. pain improving since admission. -N/-V. ze CLD.     SUBJECTIVE: Pt seen and examined at bedside this am by surgery team. No acute complaints. Tolerating diet, pain well controlled. Denies f/n/v/cp/sob.    MEDICATIONS  (STANDING):  cefTRIAXone   IVPB 1000 milliGRAM(s) IV Intermittent every 24 hours  heparin   Injectable 5000 Unit(s) SubCutaneous every 8 hours  lactated ringers. 1000 milliLiter(s) (120 mL/Hr) IV Continuous <Continuous>  metroNIDAZOLE  IVPB 500 milliGRAM(s) IV Intermittent every 8 hours    MEDICATIONS  (PRN):  acetaminophen   IVPB .. 1000 milliGRAM(s) IV Intermittent once PRN Mild Pain (1 - 3)  ondansetron Injectable 4 milliGRAM(s) IV Push every 6 hours PRN Nausea  oxyCODONE    IR 5 milliGRAM(s) Oral every 6 hours PRN Moderate Pain (4 - 6)    Vital Signs Last 24 Hrs  T(C): 36.6 (2023 05:24), Max: 37.1 (2023 10:42)  T(F): 97.9 (2023 05:24), Max: 98.7 (2023 10:42)  HR: 68 (2023 05:24) (68 - 94)  BP: 110/73 (2023 05:24) (102/67 - 122/76)  BP(mean): --  RR: 17 (2023 05:24) (16 - 18)  SpO2: 99% (2023 05:24) (97% - 99%)    Parameters below as of 2023 05:24  Patient On (Oxygen Delivery Method): room air    PHYSICAL EXAM:    Constitutional: A&Ox3, NAD    Respiratory: non labored breathing, no respiratory distress    Cardiovascular: NSR, RRR    Gastrointestinal: abdomen soft, nd, mild to moderate ttp to RLQ, no rebound or guarding    Extremities: wwp, no calf tenderness or edema. SCDs in place     I&O's Detail    2023 07:01  -  2023 07:00  --------------------------------------------------------  IN:    IV PiggyBack: 200 mL    Lactated Ringers: 1200 mL  Total IN: 1400 mL    OUT:    Voided (mL): 990 mL  Total OUT: 990 mL    Total NET: 410 mL          LABS:                        14.8   6.20  )-----------( 276      ( 2023 10:53 )             42.8     -    141  |  103  |  9   ----------------------------<  100<H>  4.1   |  28  |  0.84    Ca    9.8      2023 10:53    TPro  7.7  /  Alb  4.4  /  TBili  1.5<H>  /  DBili  0.2  /  AST  24  /  ALT  51<H>  /  AlkPhos  91        Urinalysis Basic - ( 2023 13:00 )    Color: Yellow / Appearance: Clear / S.015 / pH: x  Gluc: x / Ketone: NEGATIVE  / Bili: Negative / Urobili: 0.2 E.U./dL   Blood: x / Protein: NEGATIVE mg/dL / Nitrite: NEGATIVE   Leuk Esterase: NEGATIVE / RBC: x / WBC x   Sq Epi: x / Non Sq Epi: x / Bacteria: x        RADIOLOGY & ADDITIONAL STUDIES:

## 2023-06-22 NOTE — PROGRESS NOTE ADULT - ASSESSMENT
33M w/ recent admission (4/19-4/23/23) for perforated appendicitis determined to be possible mucocele (discharged w/ abx s/p IR drainage) here for recurrent RLQ pain likely secondary to cecal mass w/ stable CT scan.    CLD/IVF  Ceftriaxone/Flagyl   Pain/nausea control PRN  OOBA/IS  SCDs/SQH  Plan for OR 6/26 for right hemicolectomy   Pre op medicine clearance

## 2023-06-22 NOTE — DISCHARGE NOTE PROVIDER - HOSPITAL COURSE
33M w/ recent admission (4/19-4/23/23) for perforated appendicitis determined to be possible mucocele (discharged w/ abx s/p IR drainage) here for recurrent RLQ pain likely secondary to cecal mass w/ stable CT scan. Diet was advanced as tolerated. No complications during admission. Patient clinically improved; decision was made to discharge and return electively for planned surgery (right hemicolectomy) on 6/26/22. Patient was given clear instruction on diet and mechanical/antibiotic bowel prep at home. At time of discharge pt is tolerating diet, pain well controlled, pt is ambulating/voiding freely, having adequate bowel function. Pt is HD stable and medically ready for discharge.

## 2023-06-22 NOTE — DISCHARGE NOTE PROVIDER - NSDCMRMEDTOKEN_GEN_ALL_CORE_FT
cefpodoxime 200 mg oral tablet: 1 tab(s) orally every 12 hours  metroNIDAZOLE 500 mg oral tablet: 1 tab(s) orally every 8 hours  oxyCODONE 5 mg oral tablet: 1 tab(s) orally every 6 hours as needed for  severe pain MDD: 4 tablets

## 2023-06-22 NOTE — DISCHARGE NOTE PROVIDER - NSDCFUADDINST_GEN_ALL_CORE_FT
Please follow up with Dr. Flores's office regarding timing of when to report to [facility] for planned surgery on 6/26/23. The office will prescribe golytely (mechanical prep) to start on Sunday 6/25, please follow further instructions provided to you. Continue antibiotic bowel prep (cefpodoxime and flagyl) through Sunday 6/25.     Warning Signs:  Please call your doctor or nurse practitioner if you experience the following:  *You experience new chest pain, pressure, squeezing or tightness.  *New or worsening cough, shortness of breath, or wheeze.  *If you are vomiting and cannot keep down fluids or your medications.  *You are getting dehydrated due to continued vomiting, diarrhea, or other reasons. Signs of dehydration include dry mouth, rapid heartbeat, or feeling dizzy or faint when standing.  *You see blood or dark/black material when you vomit or have a bowel movement.  *You experience burning when you urinate, have blood in your urine, or experience a discharge.  *Your pain is not improving within 8-12 hours or is not gone within 24 hours. Call or return immediately if your pain is getting worse, changes location, or moves to your chest or back.  *You have shaking chills, or fever greater than 101.5 degrees Fahrenheit or 38 degrees Celsius.  *Any change in your symptoms, or any new symptoms that concern you.    New medications:  1. Cefpodoxime (200mg every 12 hours) through Sunday 6/25/23.   2. Metronidazole (500mg every 8 hours) through Sunday 6/25/23.   3. Oxycodone (5mg every 6 hours as needed for severe pain). You may take over the counter tylenol 1000mg every 6 hours for mild to moderate pain. Do not exceed 4000mg of tylenol in 24 hours.

## 2023-06-28 DIAGNOSIS — K38.8 OTHER SPECIFIED DISEASES OF APPENDIX: ICD-10-CM

## 2023-07-01 ENCOUNTER — TRANSCRIPTION ENCOUNTER (OUTPATIENT)
Age: 33
End: 2023-07-01

## 2023-07-02 ENCOUNTER — INPATIENT (INPATIENT)
Facility: HOSPITAL | Age: 33
LOS: 1 days | Discharge: HOME | DRG: 340 | End: 2023-07-04
Attending: COLON & RECTAL SURGERY | Admitting: COLON & RECTAL SURGERY
Payer: COMMERCIAL

## 2023-07-02 VITALS
HEART RATE: 99 BPM | OXYGEN SATURATION: 99 % | DIASTOLIC BLOOD PRESSURE: 77 MMHG | HEIGHT: 69 IN | RESPIRATION RATE: 18 BRPM | WEIGHT: 195.11 LBS | SYSTOLIC BLOOD PRESSURE: 117 MMHG | TEMPERATURE: 98 F

## 2023-07-02 DIAGNOSIS — C18.1 MALIGNANT NEOPLASM OF APPENDIX: ICD-10-CM

## 2023-07-02 DIAGNOSIS — K35.32 ACUTE APPENDICITIS WITH PERFORATION, LOCALIZED PERITONITIS, AND GANGRENE, WITHOUT ABSCESS: ICD-10-CM

## 2023-07-02 LAB
ALBUMIN SERPL ELPH-MCNC: 4.5 G/DL — SIGNIFICANT CHANGE UP (ref 3.3–5)
ALP SERPL-CCNC: 75 U/L — SIGNIFICANT CHANGE UP (ref 40–120)
ALT FLD-CCNC: 44 U/L — SIGNIFICANT CHANGE UP (ref 10–45)
ANION GAP SERPL CALC-SCNC: 8 MMOL/L — SIGNIFICANT CHANGE UP (ref 5–17)
APTT BLD: 39.6 SEC — HIGH (ref 27.5–35.5)
AST SERPL-CCNC: 24 U/L — SIGNIFICANT CHANGE UP (ref 10–40)
BASOPHILS # BLD AUTO: 0.02 K/UL — SIGNIFICANT CHANGE UP (ref 0–0.2)
BASOPHILS NFR BLD AUTO: 0.4 % — SIGNIFICANT CHANGE UP (ref 0–2)
BILIRUB SERPL-MCNC: 1.5 MG/DL — HIGH (ref 0.2–1.2)
BLD GP AB SCN SERPL QL: NEGATIVE — SIGNIFICANT CHANGE UP
BUN SERPL-MCNC: 12 MG/DL — SIGNIFICANT CHANGE UP (ref 7–23)
CALCIUM SERPL-MCNC: 9.7 MG/DL — SIGNIFICANT CHANGE UP (ref 8.4–10.5)
CHLORIDE SERPL-SCNC: 105 MMOL/L — SIGNIFICANT CHANGE UP (ref 96–108)
CO2 SERPL-SCNC: 27 MMOL/L — SIGNIFICANT CHANGE UP (ref 22–31)
CREAT SERPL-MCNC: 0.9 MG/DL — SIGNIFICANT CHANGE UP (ref 0.5–1.3)
EGFR: 115 ML/MIN/1.73M2 — SIGNIFICANT CHANGE UP
EOSINOPHIL # BLD AUTO: 0.12 K/UL — SIGNIFICANT CHANGE UP (ref 0–0.5)
EOSINOPHIL NFR BLD AUTO: 2.7 % — SIGNIFICANT CHANGE UP (ref 0–6)
GLUCOSE SERPL-MCNC: 103 MG/DL — HIGH (ref 70–99)
HCT VFR BLD CALC: 44.1 % — SIGNIFICANT CHANGE UP (ref 39–50)
HGB BLD-MCNC: 15.2 G/DL — SIGNIFICANT CHANGE UP (ref 13–17)
IMM GRANULOCYTES NFR BLD AUTO: 0.2 % — SIGNIFICANT CHANGE UP (ref 0–0.9)
INR BLD: 1.09 — SIGNIFICANT CHANGE UP (ref 0.88–1.16)
LYMPHOCYTES # BLD AUTO: 1.58 K/UL — SIGNIFICANT CHANGE UP (ref 1–3.3)
LYMPHOCYTES # BLD AUTO: 35.4 % — SIGNIFICANT CHANGE UP (ref 13–44)
MCHC RBC-ENTMCNC: 30.5 PG — SIGNIFICANT CHANGE UP (ref 27–34)
MCHC RBC-ENTMCNC: 34.5 GM/DL — SIGNIFICANT CHANGE UP (ref 32–36)
MCV RBC AUTO: 88.4 FL — SIGNIFICANT CHANGE UP (ref 80–100)
MONOCYTES # BLD AUTO: 0.4 K/UL — SIGNIFICANT CHANGE UP (ref 0–0.9)
MONOCYTES NFR BLD AUTO: 9 % — SIGNIFICANT CHANGE UP (ref 2–14)
NEUTROPHILS # BLD AUTO: 2.33 K/UL — SIGNIFICANT CHANGE UP (ref 1.8–7.4)
NEUTROPHILS NFR BLD AUTO: 52.3 % — SIGNIFICANT CHANGE UP (ref 43–77)
NRBC # BLD: 0 /100 WBCS — SIGNIFICANT CHANGE UP (ref 0–0)
PLATELET # BLD AUTO: 329 K/UL — SIGNIFICANT CHANGE UP (ref 150–400)
POTASSIUM SERPL-MCNC: 4.5 MMOL/L — SIGNIFICANT CHANGE UP (ref 3.5–5.3)
POTASSIUM SERPL-SCNC: 4.5 MMOL/L — SIGNIFICANT CHANGE UP (ref 3.5–5.3)
PROT SERPL-MCNC: 7.6 G/DL — SIGNIFICANT CHANGE UP (ref 6–8.3)
PROTHROM AB SERPL-ACNC: 13 SEC — SIGNIFICANT CHANGE UP (ref 10.5–13.4)
RBC # BLD: 4.99 M/UL — SIGNIFICANT CHANGE UP (ref 4.2–5.8)
RBC # FLD: 13 % — SIGNIFICANT CHANGE UP (ref 10.3–14.5)
RH IG SCN BLD-IMP: POSITIVE — SIGNIFICANT CHANGE UP
SODIUM SERPL-SCNC: 140 MMOL/L — SIGNIFICANT CHANGE UP (ref 135–145)
WBC # BLD: 4.46 K/UL — SIGNIFICANT CHANGE UP (ref 3.8–10.5)
WBC # FLD AUTO: 4.46 K/UL — SIGNIFICANT CHANGE UP (ref 3.8–10.5)

## 2023-07-02 PROCEDURE — 99285 EMERGENCY DEPT VISIT HI MDM: CPT

## 2023-07-02 RX ORDER — ONDANSETRON 8 MG/1
4 TABLET, FILM COATED ORAL EVERY 6 HOURS
Refills: 0 | Status: DISCONTINUED | OUTPATIENT
Start: 2023-07-02 | End: 2023-07-03

## 2023-07-02 RX ORDER — MORPHINE SULFATE 50 MG/1
4 CAPSULE, EXTENDED RELEASE ORAL ONCE
Refills: 0 | Status: DISCONTINUED | OUTPATIENT
Start: 2023-07-02 | End: 2023-07-02

## 2023-07-02 RX ORDER — SODIUM CHLORIDE 9 MG/ML
1000 INJECTION INTRAMUSCULAR; INTRAVENOUS; SUBCUTANEOUS ONCE
Refills: 0 | Status: COMPLETED | OUTPATIENT
Start: 2023-07-02 | End: 2023-07-02

## 2023-07-02 RX ORDER — HEPARIN SODIUM 5000 [USP'U]/ML
5000 INJECTION INTRAVENOUS; SUBCUTANEOUS EVERY 8 HOURS
Refills: 0 | Status: DISCONTINUED | OUTPATIENT
Start: 2023-07-02 | End: 2023-07-03

## 2023-07-02 RX ORDER — SODIUM CHLORIDE 9 MG/ML
1000 INJECTION INTRAMUSCULAR; INTRAVENOUS; SUBCUTANEOUS ONCE
Refills: 0 | Status: DISCONTINUED | OUTPATIENT
Start: 2023-07-02 | End: 2023-07-02

## 2023-07-02 RX ORDER — METRONIDAZOLE 500 MG
500 TABLET ORAL
Refills: 0 | Status: COMPLETED | OUTPATIENT
Start: 2023-07-02 | End: 2023-07-02

## 2023-07-02 RX ORDER — NEOMYCIN SULFATE 500 MG/1
500 TABLET ORAL
Refills: 0 | Status: DISCONTINUED | OUTPATIENT
Start: 2023-07-02 | End: 2023-07-02

## 2023-07-02 RX ORDER — SOD SULF/SODIUM/NAHCO3/KCL/PEG
4000 SOLUTION, RECONSTITUTED, ORAL ORAL ONCE
Refills: 0 | Status: COMPLETED | OUTPATIENT
Start: 2023-07-02 | End: 2023-07-02

## 2023-07-02 RX ORDER — ONDANSETRON 8 MG/1
4 TABLET, FILM COATED ORAL ONCE
Refills: 0 | Status: COMPLETED | OUTPATIENT
Start: 2023-07-02 | End: 2023-07-02

## 2023-07-02 RX ORDER — ACETAMINOPHEN 500 MG
1000 TABLET ORAL EVERY 6 HOURS
Refills: 0 | Status: DISCONTINUED | OUTPATIENT
Start: 2023-07-02 | End: 2023-07-02

## 2023-07-02 RX ORDER — NEOMYCIN SULFATE 500 MG/1
500 TABLET ORAL
Refills: 0 | Status: COMPLETED | OUTPATIENT
Start: 2023-07-02 | End: 2023-07-02

## 2023-07-02 RX ORDER — ACETAMINOPHEN 500 MG
650 TABLET ORAL EVERY 6 HOURS
Refills: 0 | Status: DISCONTINUED | OUTPATIENT
Start: 2023-07-02 | End: 2023-07-02

## 2023-07-02 RX ORDER — SODIUM CHLORIDE 9 MG/ML
1000 INJECTION, SOLUTION INTRAVENOUS
Refills: 0 | Status: DISCONTINUED | OUTPATIENT
Start: 2023-07-02 | End: 2023-07-03

## 2023-07-02 RX ORDER — ACETAMINOPHEN 500 MG
650 TABLET ORAL EVERY 6 HOURS
Refills: 0 | Status: DISCONTINUED | OUTPATIENT
Start: 2023-07-02 | End: 2023-07-03

## 2023-07-02 RX ORDER — METRONIDAZOLE 500 MG
500 TABLET ORAL
Refills: 0 | Status: DISCONTINUED | OUTPATIENT
Start: 2023-07-02 | End: 2023-07-02

## 2023-07-02 RX ORDER — ONDANSETRON 8 MG/1
4 TABLET, FILM COATED ORAL ONCE
Refills: 0 | Status: DISCONTINUED | OUTPATIENT
Start: 2023-07-02 | End: 2023-07-02

## 2023-07-02 RX ADMIN — NEOMYCIN SULFATE 500 MILLIGRAM(S): 500 TABLET ORAL at 23:21

## 2023-07-02 RX ADMIN — SODIUM CHLORIDE 1000 MILLILITER(S): 9 INJECTION INTRAMUSCULAR; INTRAVENOUS; SUBCUTANEOUS at 11:19

## 2023-07-02 RX ADMIN — Medication 10 MILLIGRAM(S): at 12:50

## 2023-07-02 RX ADMIN — Medication 500 MILLIGRAM(S): at 23:20

## 2023-07-02 RX ADMIN — ONDANSETRON 4 MILLIGRAM(S): 8 TABLET, FILM COATED ORAL at 11:19

## 2023-07-02 RX ADMIN — Medication 4000 MILLILITER(S): at 16:49

## 2023-07-02 RX ADMIN — Medication 500 MILLIGRAM(S): at 14:40

## 2023-07-02 RX ADMIN — HEPARIN SODIUM 5000 UNIT(S): 5000 INJECTION INTRAVENOUS; SUBCUTANEOUS at 14:02

## 2023-07-02 RX ADMIN — NEOMYCIN SULFATE 500 MILLIGRAM(S): 500 TABLET ORAL at 14:40

## 2023-07-02 RX ADMIN — Medication 500 MILLIGRAM(S): at 18:44

## 2023-07-02 RX ADMIN — NEOMYCIN SULFATE 500 MILLIGRAM(S): 500 TABLET ORAL at 18:44

## 2023-07-02 RX ADMIN — HEPARIN SODIUM 5000 UNIT(S): 5000 INJECTION INTRAVENOUS; SUBCUTANEOUS at 22:55

## 2023-07-02 NOTE — ED PROVIDER NOTE - PHYSICAL EXAMINATION
Vitals reviewed  Gen: well appearing, nad, speaking in full sentences  Skin: wwp, no rash/lesions  HEENT: ncat, eomi, mmm  CV: rrr, no audible m/r/g  Resp: symmetrical expansion, ctab, no w/r/r  Abd: nondistended, soft, RLQ ttp, no r/g, no cvat   Ext: FROM throughout, no peripheral edema  Neuro: alert/oriented, no focal deficits, steady gait

## 2023-07-02 NOTE — ED PROVIDER NOTE - ATTENDING APP SHARED VISIT CONTRIBUTION OF CARE
I discussed the plan of care of the patient directly with the PA while the patient was in the Emergency Department. I have reviewed the ACP note and agree with the history, exam and plan of care.

## 2023-07-02 NOTE — CONSULT NOTE ADULT - SUBJECTIVE AND OBJECTIVE BOX
CARMEN VASQUEZ  33y/Male    Patient is a 33y old  Male who presents with a chief complaint of right lower quadrant abdominal pain.     Mr. Carmen Vasquez is a 33/M who denies chronic medical illnesses and who was recently admitted in April 2023 for perforated appendicitis determined to be possible mucocele and treated with antibiotic and IR drainage. He has been well since discharge until 1 day prior to admission when he started to develop right lower quadrant constant pain with nausea that progressively got worse therefore he presented to the ED. He denies vomiting, fever, chills, diarrhea, constipation, melena, hematochezia, hematemesis, dysuria, headache, dizziness, chest pain, cough, dyspnea, rash or any other new complaints.     Review of Systems:  The rest of the 14 systems reviewed and found negative except per HPI.    PAST MEDICAL & SURGICAL HISTORY:  Perforated appendicitis    PERSONAL SOCIAL HISTORY:  Denies smoking  Occasional alcohol use  Denies illicit drug use    FAMILY HISTORY:  Denies premature atherosclerosis in the family  Denies cancer in the family    HOME MEDICATIONS:  None    MEDICATIONS  (STANDING):  heparin   Injectable 5000 Unit(s) SubCutaneous every 8 hours  metroNIDAZOLE    Tablet 500 milliGRAM(s) Oral <User Schedule>  neomycin 500 milliGRAM(s) Oral <User Schedule>    MEDICATIONS  (PRN):  acetaminophen     Tablet .. 1000 milliGRAM(s) Oral every 6 hours PRN Temp greater or equal to 38C (100.4F), Mild Pain (1 - 3), Moderate Pain (4 - 6)  ondansetron Injectable 4 milliGRAM(s) IV Push every 6 hours PRN Nausea    T(C): 36.5 (07-02-23 @ 12:46), Max: 37 (07-02-23 @ 11:46)  HR: 71 (07-02-23 @ 12:46) (71 - 99)  BP: 103/67 (07-02-23 @ 12:46) (103/67 - 120/78)  RR: 17 (07-02-23 @ 12:46) (17 - 18)  SpO2: 97% (07-02-23 @ 12:46) (97% - 100%)  Wt(kg): --Vital Signs Last 24 Hrs  T(C): 36.5 (02 Jul 2023 12:46), Max: 37 (02 Jul 2023 11:46)  T(F): 97.7 (02 Jul 2023 12:46), Max: 98.6 (02 Jul 2023 11:46)  HR: 71 (02 Jul 2023 12:46) (71 - 99)  BP: 103/67 (02 Jul 2023 12:46) (103/67 - 120/78)  BP(mean): --  RR: 17 (02 Jul 2023 12:46) (17 - 18)  SpO2: 97% (02 Jul 2023 12:46) (97% - 100%)    Parameters below as of 02 Jul 2023 12:46  Patient On (Oxygen Delivery Method): room air    Oxygen Saturation Index= Unable to calculate   [Based on FiO2 = Unknown, SpO2 = 97(07/02/2023 12:46), MAP = Unknown]  Daily Height in cm: 175.26 (02 Jul 2023 09:49)    Daily     PHYSICAL EXAM:  GENERAL: NAD  HEAD:  Atraumatic, Normocephalic  EYES: EOMI, PERRLA, conjunctiva and sclera clear  ENMT: No tonsillar erythema, exudates, or enlargement; Moist mucous membranes  NECK: Supple, No JVD  NERVOUS SYSTEM:  Alert & Oriented X3, Good concentration; Nonfocal exam  CHEST/LUNG: Clear to percussion bilaterally  HEART: Regular rate and rhythm; S1 and S2 heard  ABDOMEN: Soft, (+) direct RLQ tenderness without guarding, Nondistended; Bowel sounds present  EXTREMITIES:  2+ Peripheral Pulses, No clubbing, cyanosis, or edema  LYMPH: No lymphadenopathy noted  SKIN: No obvious rash  PSYCH: Normal mood and affect    Consultant(s) Notes Reviewed:  [x ] YES  [ ] NO  Care Discussed with Consultants/Other Providers [ x] YES  [ ] NO    LABS:  CBC   07-02-23 @ 11:09  Hematcorit 44.1  Hemoglobin 15.2  Mean Cell Hemoglobin 30.5  Platelet Count-Automated 329  RBC Count 4.99  Red Cell Distrib Width 13.0  Wbc Count 4.46    BMP  07-02-23 @ 11:09  Anion Gap. Serum 8  Blood Urea Nitrogen,Serm 12  Calcium, Total Serum 9.7  Carbon Dioxide, Serum 27  Chloride, Serum 105  Creatinine, Serum 0.90  eGFR in  --  eGFR in Non Afican American --  Gloucose, serum 103  Potassium, Serum 4.5  Sodium, Serum 140    CMP  07-02-23 @ 11:09  Paula Aminotransferase(ALT/SGPT)44  Albumin, Serum 4.5  Alkaline Phosphatase, Serum 75  Anion Gap, Serum 8  Aspartate Aminotransferase (AST/SGOT)24  Bilirubin Total, Serum 1.5  Blood Urea Nitrogen, Serum 12  Calcium,Total Serum 9.7  Carbon Dioxide, Serum 27  Chloride, Serum 105  Creatinine, Serum 0.90  eGFR if  --  eGFR if Non African American --  Glucose, Serum 103  Potassium, Serum 4.5  Protein Total, Serum 7.6  Sodium, Serum 140    PT/INR  PT/INR  07-02-23 @ 11:09  INR 1.09  Prothrombin Time Comment --  Prothrobin Time, [id number]

## 2023-07-02 NOTE — CONSULT NOTE ADULT - ASSESSMENT
Mr. Abraham Rodriguez is a 33/M who denies chronic medical illnesses and who was recently admitted in April 2023 for perforated appendicitis determined to be possible mucocele and treated with antibiotic and IR drainage. Per H&P, Interval CT performed on 5/10/23 showed "Complex mass containing radiodensities i.e. calcifications seen contiguous with the inferior pole of cecum. 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    Recommendations:    Management of possible cecal neoplasm per primary surgery team.  Currently doing bowel prep  Previously found to have indirect hyperbilirubinemia, stable Tbil level from previous, no associated anemia; may need outpatient follow up for possible work up for conditions such as impaired bilirubin conjugation; monitor for now    Thank you for allowing us to participate in the care of your patient. Feel free to reach out for any questions.             Mr. Abraham Rodriguez is a 33/M who denies chronic medical illnesses and who was recently admitted in April 2023 for perforated appendicitis determined to be possible mucocele and treated with antibiotic and IR drainage. Per H&P, Interval CT performed on 5/10/23 showed "Complex mass containing radiodensities i.e. calcifications seen contiguous with the inferior pole of cecum. 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    Recommendations:    Management of possible cecal neoplasm per primary surgery team.  Currently doing bowel prep  Previously found to have indirect hyperbilirubinemia, stable Tbil level from previous, no associated anemia, CTAP from June 2023 showed normal liver, GB and bile ducts; may need outpatient follow up for possible work up for conditions such as impaired bilirubin conjugation; monitor for now    Thank you for allowing us to participate in the care of your patient. Feel free to reach out for any questions.

## 2023-07-02 NOTE — ED ADULT NURSE NOTE - OBJECTIVE STATEMENT
Pt present to ED c/o of RLQ abdominal pain, 9/10 since Friday. Pt was recently at [facility] for similar symptoms, d/c with antibiotic, unsure of name. Pt is A+Ox4, no respiratory distress noted. Denies chest pain/palpitations. Reports RLQ abdominal pain, w/ radiation to R inner thigh, reports nausea, loose stool x 2 on Saturday, denies blood. Reports decrease in appetite. Abdomen is non distended, soft, tender to palpation. Denies emesis. Denies fever or chills, denies urinary symptoms. Ambulates independently. No pain meds taken PTA, been taking Tylenol with little effect. Pending provider assessment.

## 2023-07-02 NOTE — ED ADULT NURSE NOTE - NSFALLUNIVINTERV_ED_ALL_ED
Bed/Stretcher in lowest position, wheels locked, appropriate side rails in place/Call bell, personal items and telephone in reach/Instruct patient to call for assistance before getting out of bed/chair/stretcher/Non-slip footwear applied when patient is off stretcher/[employer] to call system/Physically safe environment - no spills, clutter or unnecessary equipment/Purposeful proactive rounding/Room/bathroom lighting operational, light cord in reach

## 2023-07-02 NOTE — ED PROVIDER NOTE - CLINICAL SUMMARY MEDICAL DECISION MAKING FREE TEXT BOX
33 M h/o perf appendicitis determined to be possible mucocele (discharged w/ abx s/p IR drainage), recurrent RLQ abd pain likely secondary to cecal mass presents for planned R hemicolectomy w/ Dr. Ramos.  pt vss, + RLQ ttp, no r/g, no cvat.  will check labs and d/w surg    surg to admit to regional.  NPO at midnight for OR tomorrow

## 2023-07-02 NOTE — ED PROVIDER NOTE - OBJECTIVE STATEMENT
33 M h/o perf appendicitis determined to be possible mucocele (discharged w/ abx s/p IR drainage), recurrent RLQ abd pain likely secondary to cecal mass presents for planned R hemicolectomy w/ Dr. Ramos.  pt reports persistent RLQ abd pain and nausea.  no vomiting. denies f/c, HA, chest pain, sob, vd, constipation, urinary sxs, trauma

## 2023-07-02 NOTE — ED ADULT TRIAGE NOTE - CHIEF COMPLAINT QUOTE
"I have pain to the right side of my stomach since yesterday with diarrhea and nausea". Patient denies fevers, but is tender to RLQ.

## 2023-07-02 NOTE — H&P ADULT - HISTORY OF PRESENT ILLNESS
33M w/ PMHx of recent admission (4/19-4/23/23) for perforated appendicitis determined to be possible mucocele (discharged w/ abx s/p IR drainage), no PSHx presents from Dr. Flores's offce today w/ recurrent abdominal pain since last night. He describes his pain as constant, throbbing pain that feels exactly like his previous appendicitis episode. Endorses mild nausea w/o vomiting. Denies f/c. Denies c/d. Denies cp/sob. Denies urinary or bowel complaints. Denies PO tolerance.    Interval CT performed on 5/10/23 showed "Complex mass containing radiodensities i.e. calcifications seen contiguous with the inferior pole of cecum. 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 Cannot exclude abscess."    In the ED, pt was afebrile, nontachycardic, normotensive, and satting well on RA. Labs significant for WBC 4.5. Hgb 15.2.     PMHx: Perforated appy vs mucocele 4/19/23 s/p IR and abx  PSHx: Denies  Social Hx: Social EtOH  Family Hx: Denies family hx of IBS, Crohn's or colon cancer. Mother has UC.  Last Cscope: Denies  Last EGD: Denies  All: No Known Allergies  Meds: Allegra    Vitals past 24h:  T(F): 98.5 (09:49), Max: 98.5 (09:49)  HR: 99 (09:49) (99 - 99)  BP: 117/77 (09:49) (117/77 - 117/77)  ABP: --  RR: 18 (09:49) (18 - 18)  SpO2: 99% (09:49) (99% - 99%)     33M w/ PMHx of recent admission (4/19-4/23/23) for perforated appendicitis determined to be possible mucocele (discharged w/ abx s/p IR drainage), no PSHx presents w/ recurrent abdominal pain since last night. He describes his pain as constant, throbbing pain that feels exactly like his previous episodes. Denies n/v. Denies f/c. Denies c/d. Denies cp/sob. Denies urinary or bowel complaints. Tolerating PO.    Interval CT performed on 5/10/23 showed "Complex mass containing radiodensities i.e. calcifications seen contiguous with the inferior pole of cecum. 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 Cannot exclude abscess."    In the ED, pt was afebrile, nontachycardic, normotensive, and satting well on RA. Labs significant for WBC 4.5. Hgb 15.2.     PMHx: Perforated appy vs mucocele 4/19/23 s/p IR and abx  PSHx: Denies  Social Hx: Social EtOH  Family Hx: Denies family hx of IBS, Crohn's or colon cancer. Mother has UC.  Last Cscope: Denies  Last EGD: Denies  All: No Known Allergies  Meds: Allegra    Vitals past 24h:  T(F): 98.5 (09:49), Max: 98.5 (09:49)  HR: 99 (09:49) (99 - 99)  BP: 117/77 (09:49) (117/77 - 117/77)  ABP: --  RR: 18 (09:49) (18 - 18)  SpO2: 99% (09:49) (99% - 99%)

## 2023-07-02 NOTE — ED ADULT TRIAGE NOTE - TEMPERATURE IN FAHRENHEIT (DEGREES F)
.Caller: Pt     Doctor: Odalys    Reason for call: pain level is a 0, the injection worked.    Call back#: [phone]    
1st attempt  Unable to lm to cb with % improvement and pain level.     Right Shoulder Injection 12/28, 2/2 F/u   
2nd attempt  Unable to lm to cb with % improvement and pain level.     
CNR letter sent  
98.5

## 2023-07-02 NOTE — H&P ADULT - NSHPLABSRESULTS_GEN_ALL_CORE
LABS:                        15.2   4.46  )-----------( 329      ( 02 Jul 2023 11:09 )             44.1           PT/INR - ( 02 Jul 2023 11:09 )   PT: 13.0 sec;   INR: 1.09          PTT - ( 02 Jul 2023 11:09 )  PTT:39.6 sec

## 2023-07-02 NOTE — ED ADULT TRIAGE NOTE - HISTORY OF COVID-19 VACCINATION
Vital Signs Last 24 Hrs  T(C): 36.8 (10 Apr 2023 09:48), Max: 37 (09 Apr 2023 17:57)  T(F): 98.3 (10 Apr 2023 09:48), Max: 98.6 (09 Apr 2023 17:57)  HR: 94 (10 Apr 2023 08:29) (82 - 100)  BP: 142/95 (10 Apr 2023 08:29) (121/76 - 143/97)  BP(mean): 113 (10 Apr 2023 08:29) (93 - 114)  RR: 18 (10 Apr 2023 04:00) (16 - 18)  SpO2: 96% (10 Apr 2023 08:29) (95% - 98%)    Parameters below as of 10 Apr 2023 08:29  Patient On (Oxygen Delivery Method): room air    
Vital Signs Last 24 Hrs  T(C): 36.6 (09 Apr 2023 21:45), Max: 37 (09 Apr 2023 17:57)  T(F): 97.8 (09 Apr 2023 21:45), Max: 98.6 (09 Apr 2023 17:57)  HR: 92 (09 Apr 2023 20:30) (82 - 94)  BP: 136/88 (09 Apr 2023 20:30) (130/84 - 141/84)  BP(mean): 107 (09 Apr 2023 20:30) (100 - 112)  RR: 17 (09 Apr 2023 20:30) (16 - 18)  SpO2: 96% (09 Apr 2023 20:30) (96% - 98%)    Parameters below as of 09 Apr 2023 20:30  Patient On (Oxygen Delivery Method): room air    
Vital Signs Last 24 Hrs  T(C): 36.7 (11 Apr 2023 08:56), Max: 37.2 (10 Apr 2023 22:00)  T(F): 98.1 (11 Apr 2023 08:56), Max: 99 (10 Apr 2023 22:00)  HR: 88 (11 Apr 2023 07:57) (74 - 108)  BP: 133/95 (11 Apr 2023 07:57) (119/72 - 141/90)  BP(mean): 108 (11 Apr 2023 07:57) (90 - 110)  RR: 20 (11 Apr 2023 07:57) (16 - 20)  SpO2: 98% (11 Apr 2023 07:57) (95% - 98%)    Parameters below as of 11 Apr 2023 07:57  Patient On (Oxygen Delivery Method): room air    
Yes
Vital Signs Last 24 Hrs  T(C): 37.6 (08 Apr 2023 18:48), Max: 37.6 (08 Apr 2023 18:48)  T(F): 99.6 (08 Apr 2023 18:48), Max: 99.6 (08 Apr 2023 18:48)  HR: 92 (08 Apr 2023 16:15) (76 - 104)  BP: 130/77 (08 Apr 2023 16:15) (125/91 - 160/82)  BP(mean): 96 (08 Apr 2023 16:15) (91 - 110)  RR: 15 (08 Apr 2023 16:15) (14 - 18)  SpO2: 97% (08 Apr 2023 16:15) (96% - 98%)    Parameters below as of 08 Apr 2023 16:15  Patient On (Oxygen Delivery Method): room air

## 2023-07-02 NOTE — H&P ADULT - NSHPPHYSICALEXAM_GEN_ALL_CORE
Physical Exam  General: NAD, laying comfortably in bed  Cardio: S1,S2, No MRG, RRR  Pulm: No respiratory distress, breathing comfortably on room air  Abdomen: soft, RLQ TTP, nondistended  Extremities: WWP, peripheral pulses appreciated  Neuro: CNII-XII grossly intact, no gross motor deficits  Psych: AAOx3, pleasant

## 2023-07-02 NOTE — H&P ADULT - ASSESSMENT
33M w/ recent admission (4/19-4/23/23) for perforated appendicitis determined to be possible mucocele (discharged w/ abx s/p IR drainage) here for recurrent RLQ pain likely secondary to cecal mass w/ plans for R piper tomorrow.    CLD, NPO@[place]  Pain/nausea PRN  OOBA/IS  SCDs/SQH  Bowel prep  Admit regional, team 1, Dr. Flores

## 2023-07-03 ENCOUNTER — TRANSCRIPTION ENCOUNTER (OUTPATIENT)
Age: 33
End: 2023-07-03

## 2023-07-03 LAB
ANION GAP SERPL CALC-SCNC: 9 MMOL/L — SIGNIFICANT CHANGE UP (ref 5–17)
APTT BLD: 37.3 SEC — HIGH (ref 27.5–35.5)
BUN SERPL-MCNC: 9 MG/DL — SIGNIFICANT CHANGE UP (ref 7–23)
CALCIUM SERPL-MCNC: 9.8 MG/DL — SIGNIFICANT CHANGE UP (ref 8.4–10.5)
CHLORIDE SERPL-SCNC: 108 MMOL/L — SIGNIFICANT CHANGE UP (ref 96–108)
CO2 SERPL-SCNC: 25 MMOL/L — SIGNIFICANT CHANGE UP (ref 22–31)
CREAT SERPL-MCNC: 0.93 MG/DL — SIGNIFICANT CHANGE UP (ref 0.5–1.3)
EGFR: 110 ML/MIN/1.73M2 — SIGNIFICANT CHANGE UP
GLUCOSE SERPL-MCNC: 94 MG/DL — SIGNIFICANT CHANGE UP (ref 70–99)
HCT VFR BLD CALC: 45.2 % — SIGNIFICANT CHANGE UP (ref 39–50)
HGB BLD-MCNC: 15.2 G/DL — SIGNIFICANT CHANGE UP (ref 13–17)
INR BLD: 1.1 — SIGNIFICANT CHANGE UP (ref 0.88–1.16)
MAGNESIUM SERPL-MCNC: 2 MG/DL — SIGNIFICANT CHANGE UP (ref 1.6–2.6)
MCHC RBC-ENTMCNC: 30.6 PG — SIGNIFICANT CHANGE UP (ref 27–34)
MCHC RBC-ENTMCNC: 33.6 GM/DL — SIGNIFICANT CHANGE UP (ref 32–36)
MCV RBC AUTO: 90.9 FL — SIGNIFICANT CHANGE UP (ref 80–100)
NRBC # BLD: 0 /100 WBCS — SIGNIFICANT CHANGE UP (ref 0–0)
PHOSPHATE SERPL-MCNC: 3.1 MG/DL — SIGNIFICANT CHANGE UP (ref 2.5–4.5)
PLATELET # BLD AUTO: 338 K/UL — SIGNIFICANT CHANGE UP (ref 150–400)
POTASSIUM SERPL-MCNC: 3.7 MMOL/L — SIGNIFICANT CHANGE UP (ref 3.5–5.3)
POTASSIUM SERPL-SCNC: 3.7 MMOL/L — SIGNIFICANT CHANGE UP (ref 3.5–5.3)
PROTHROM AB SERPL-ACNC: 13.1 SEC — SIGNIFICANT CHANGE UP (ref 10.5–13.4)
RBC # BLD: 4.97 M/UL — SIGNIFICANT CHANGE UP (ref 4.2–5.8)
RBC # FLD: 13 % — SIGNIFICANT CHANGE UP (ref 10.3–14.5)
SODIUM SERPL-SCNC: 142 MMOL/L — SIGNIFICANT CHANGE UP (ref 135–145)
WBC # BLD: 5.26 K/UL — SIGNIFICANT CHANGE UP (ref 3.8–10.5)
WBC # FLD AUTO: 5.26 K/UL — SIGNIFICANT CHANGE UP (ref 3.8–10.5)

## 2023-07-03 PROCEDURE — 88304 TISSUE EXAM BY PATHOLOGIST: CPT | Mod: 26

## 2023-07-03 DEVICE — SURGIFLO HEMOSTATIC MATRIX KIT: Type: IMPLANTABLE DEVICE | Status: FUNCTIONAL

## 2023-07-03 DEVICE — STAPLER COVIDIEN TRI-STAPLE 45MM PURPLE RELOAD: Type: IMPLANTABLE DEVICE | Status: FUNCTIONAL

## 2023-07-03 DEVICE — STAPLER COVIDIEN TRI-STAPLE 60MM PURPLE RELOAD: Type: IMPLANTABLE DEVICE | Status: FUNCTIONAL

## 2023-07-03 RX ORDER — OXYCODONE HYDROCHLORIDE 5 MG/1
5 TABLET ORAL EVERY 8 HOURS
Refills: 0 | Status: DISCONTINUED | OUTPATIENT
Start: 2023-07-03 | End: 2023-07-04

## 2023-07-03 RX ORDER — ONDANSETRON 8 MG/1
4 TABLET, FILM COATED ORAL EVERY 6 HOURS
Refills: 0 | Status: DISCONTINUED | OUTPATIENT
Start: 2023-07-03 | End: 2023-07-04

## 2023-07-03 RX ORDER — METRONIDAZOLE 500 MG
500 TABLET ORAL EVERY 8 HOURS
Refills: 0 | Status: DISCONTINUED | OUTPATIENT
Start: 2023-07-04 | End: 2023-07-04

## 2023-07-03 RX ORDER — CEFTRIAXONE 500 MG/1
1000 INJECTION, POWDER, FOR SOLUTION INTRAMUSCULAR; INTRAVENOUS EVERY 24 HOURS
Refills: 0 | Status: DISCONTINUED | OUTPATIENT
Start: 2023-07-04 | End: 2023-07-04

## 2023-07-03 RX ORDER — CEFTRIAXONE 500 MG/1
INJECTION, POWDER, FOR SOLUTION INTRAMUSCULAR; INTRAVENOUS
Refills: 0 | Status: DISCONTINUED | OUTPATIENT
Start: 2023-07-03 | End: 2023-07-04

## 2023-07-03 RX ORDER — ACETAMINOPHEN 500 MG
650 TABLET ORAL EVERY 6 HOURS
Refills: 0 | Status: DISCONTINUED | OUTPATIENT
Start: 2023-07-03 | End: 2023-07-04

## 2023-07-03 RX ORDER — CEFTRIAXONE 500 MG/1
1000 INJECTION, POWDER, FOR SOLUTION INTRAMUSCULAR; INTRAVENOUS ONCE
Refills: 0 | Status: COMPLETED | OUTPATIENT
Start: 2023-07-03 | End: 2023-07-03

## 2023-07-03 RX ORDER — HYDROMORPHONE HYDROCHLORIDE 2 MG/ML
0.5 INJECTION INTRAMUSCULAR; INTRAVENOUS; SUBCUTANEOUS ONCE
Refills: 0 | Status: DISCONTINUED | OUTPATIENT
Start: 2023-07-03 | End: 2023-07-04

## 2023-07-03 RX ORDER — METRONIDAZOLE 500 MG
TABLET ORAL
Refills: 0 | Status: DISCONTINUED | OUTPATIENT
Start: 2023-07-03 | End: 2023-07-04

## 2023-07-03 RX ORDER — METRONIDAZOLE 500 MG
500 TABLET ORAL ONCE
Refills: 0 | Status: COMPLETED | OUTPATIENT
Start: 2023-07-03 | End: 2023-07-03

## 2023-07-03 RX ORDER — SODIUM CHLORIDE 9 MG/ML
1000 INJECTION, SOLUTION INTRAVENOUS
Refills: 0 | Status: DISCONTINUED | OUTPATIENT
Start: 2023-07-03 | End: 2023-07-04

## 2023-07-03 RX ORDER — OXYCODONE HYDROCHLORIDE 5 MG/1
10 TABLET ORAL EVERY 8 HOURS
Refills: 0 | Status: DISCONTINUED | OUTPATIENT
Start: 2023-07-03 | End: 2023-07-04

## 2023-07-03 RX ADMIN — HEPARIN SODIUM 5000 UNIT(S): 5000 INJECTION INTRAVENOUS; SUBCUTANEOUS at 06:28

## 2023-07-03 RX ADMIN — SODIUM CHLORIDE 75 MILLILITER(S): 9 INJECTION, SOLUTION INTRAVENOUS at 23:39

## 2023-07-03 RX ADMIN — CEFTRIAXONE 100 MILLIGRAM(S): 500 INJECTION, POWDER, FOR SOLUTION INTRAMUSCULAR; INTRAVENOUS at 18:38

## 2023-07-03 RX ADMIN — SODIUM CHLORIDE 125 MILLILITER(S): 9 INJECTION, SOLUTION INTRAVENOUS at 00:14

## 2023-07-03 RX ADMIN — Medication 650 MILLIGRAM(S): at 21:32

## 2023-07-03 RX ADMIN — ONDANSETRON 4 MILLIGRAM(S): 8 TABLET, FILM COATED ORAL at 19:13

## 2023-07-03 RX ADMIN — Medication 650 MILLIGRAM(S): at 22:32

## 2023-07-03 RX ADMIN — Medication 100 MILLIGRAM(S): at 18:37

## 2023-07-03 RX ADMIN — SODIUM CHLORIDE 125 MILLILITER(S): 9 INJECTION, SOLUTION INTRAVENOUS at 09:26

## 2023-07-03 NOTE — PROGRESS NOTE ADULT - ASSESSMENT
33M w/ recent admission (4/19-4/23/23) for perforated appendicitis determined to be possible mucocele (discharged w/ abx s/p IR drainage) here for recurrent RLQ pain likely secondary to cecal mass w/ plans for R piper     OR today for lap right hemicolectomy   NPO/IVF  Pain/nausea control PRN  OOBA/IS  SCDs/SQH

## 2023-07-03 NOTE — PROGRESS NOTE ADULT - SUBJECTIVE AND OBJECTIVE BOX
32 yo M who denies chronic medical illnesses and who was recently admitted in April 2023 for perforated appendicitis determined to be possible mucocele and treated with antibiotic and IR drainage. He has been well since discharge until 1 day prior to admission when he started to develop right lower quadrant constant pain with nausea that progressively got worse therefore he presented to the ED.     This morning, patient is doing well and has no complaints. Right hemicolectomy is planned for today. He denies vomiting, fever, chills, diarrhea, constipation, melena, or hematochezia.    Review of Systems:  The rest of the 14 systems reviewed and found negative except per HPI.    PAST MEDICAL & SURGICAL HISTORY:  Perforated appendicitis    PERSONAL SOCIAL HISTORY:  Denies smoking  Occasional alcohol use  Denies illicit drug use    FAMILY HISTORY:  Denies premature atherosclerosis in the family  Denies cancer in the family    HOME MEDICATIONS:  None    MEDICATIONS  (STANDING):  heparin   Injectable 5000 Unit(s) SubCutaneous every 8 hours  lactated ringers. 1000 milliLiter(s) (125 mL/Hr) IV Continuous <Continuous>    MEDICATIONS  (PRN):  acetaminophen     Tablet .. 650 milliGRAM(s) Oral every 6 hours PRN Moderate Pain (4 - 6)  ondansetron Injectable 4 milliGRAM(s) IV Push every 6 hours PRN Nausea      T(C): 36.6 (07-03-23 @ 09:30), Max: 36.8 (07-02-23 @ 20:51)  HR: 68 (07-03-23 @ 09:30) (66 - 82)  BP: 102/62 (07-03-23 @ 09:30) (101/63 - 119/73)  RR: 18 (07-03-23 @ 09:30) (17 - 18)  SpO2: 99% (07-03-23 @ 09:30) (96% - 99%)  Wt(kg): --Vital Signs Last 24 Hrs  T(C): 36.6 (03 Jul 2023 09:30), Max: 36.8 (02 Jul 2023 20:51)  T(F): 97.8 (03 Jul 2023 09:30), Max: 98.2 (02 Jul 2023 20:51)  HR: 68 (03 Jul 2023 09:30) (66 - 82)  BP: 102/62 (03 Jul 2023 09:30) (101/63 - 119/73)  BP(mean): 75 (03 Jul 2023 09:30) (75 - 75)  RR: 18 (03 Jul 2023 09:30) (17 - 18)  SpO2: 99% (03 Jul 2023 09:30) (96% - 99%)    Parameters below as of 03 Jul 2023 09:30  Patient On (Oxygen Delivery Method): room air      Oxygen Saturation Index= Unable to calculate   [Based on FiO2 = Unknown, SpO2 = 99(07/03/2023 09:30), MAP = Unknown]  Daily Height in cm: 175.3 (02 Jul 2023 20:02)    Daily     PHYSICAL EXAM:  GENERAL: NAD  CHEST/LUNG: Clear to percussion bilaterally  HEART: Regular rate and rhythm; S1 and S2 heard  ABDOMEN: Soft, (+) direct RLQ tenderness without guarding, Nondistended; Bowel sounds present  EXTREMITIES:  2+ Peripheral Pulses, No clubbing, cyanosis, or edema    CAPILLARY BLOOD GLUCOSE    LABS:  CBC   07-03-23 @ 06:05  Hematcorit 45.2  Hemoglobin 15.2  Mean Cell Hemoglobin 30.6  Platelet Count-Automated 338  RBC Count 4.97  Red Cell Distrib Width 13.0  Wbc Count 5.26    BMP  07-03-23 @ 06:05  Anion Gap. Serum 9  Blood Urea Nitrogen,Serm 9  Calcium, Total Serum 9.8  Carbon Dioxide, Serum 25  Chloride, Serum 108  Creatinine, Serum 0.93  eGFR in  --  eGFR in Non Afican American --  Gloucose, serum 94  Potassium, Serum 3.7  Sodium, Serum 142    07-02-23 @ 11:09  Anion Gap. Serum 8  Blood Urea Nitrogen,Serm 12  Calcium, Total Serum 9.7  Carbon Dioxide, Serum 27  Chloride, Serum 105  Creatinine, Serum 0.90  eGFR in  --  eGFR in Non Afican American --  Gloucose, serum 103  Potassium, Serum 4.5  Sodium, Serum 140    CMP  07-03-23 @ 06:05  Paula Aminotransferase(ALT/SGPT)--  Albumin, Serum --  Alkaline Phosphatase, Serum --  Anion Gap, Serum 9  Aspartate Aminotransferase (AST/SGOT)--  Bilirubin Total, Serum --  Blood Urea Nitrogen, Serum 9  Calcium,Total Serum 9.8  Carbon Dioxide, Serum 25  Chloride, Serum 108  Creatinine, Serum 0.93  eGFR if  --  eGFR if Non African American --  Glucose, Serum 94  Potassium, Serum 3.7  Protein Total, Serum --  Sodium, Serum 142    07-02-23 @ 11:09  Paula Aminotransferase(ALT/SGPT)44  Albumin, Serum 4.5  Alkaline Phosphatase, Serum 75  Anion Gap, Serum 8  Aspartate Aminotransferase (AST/SGOT)24  Bilirubin Total, Serum 1.5  Blood Urea Nitrogen, Serum 12  Calcium,Total Serum 9.7  Carbon Dioxide, Serum 27  Chloride, Serum 105  Creatinine, Serum 0.90  eGFR if  --  eGFR if Non African American --  Glucose, Serum 103  Potassium, Serum 4.5  Protein Total, Serum 7.6  Sodium, Serum 140    PT/INR  PT/INR  07-03-23 @ 06:05  INR 1.10  Prothrombin Time Comment --  Prothrobin Time, [id number]  PT/INR  07-02-23 @ 11:09  INR 1.09  Prothrombin Time Comment --  Prothrobin Time, [id number]

## 2023-07-03 NOTE — PROGRESS NOTE ADULT - SUBJECTIVE AND OBJECTIVE BOX
Procedure: Laparoscopic interval appendectomy  Surgeon: Dr. Flores    S: Pt seen and examined bedside. He states that he is still a little sleepy from the anesthesia. He reports that he had felt some dizziness earlier that has since resolved. He has no acute complaints and his pain is well controlled. Denies CP, SOB, RIVER, calf tenderness or edema, nausea, emesis, or fevers.     O:  T(C): 36.9 (07-03-23 @ 19:41), Max: 36.9 (07-03-23 @ 19:41)  T(F): 98.4 (07-03-23 @ 19:41), Max: 98.4 (07-03-23 @ 19:41)  HR: 93 (07-03-23 @ 19:41) (71 - 93)  BP: 117/69 (07-03-23 @ 19:41) (112/60 - 124/65)  RR: 18 (07-03-23 @ 19:41) (18 - 22)  SpO2: 96% (07-03-23 @ 19:41) (96% - 100%)  Wt(kg): --                        15.2   5.26  )-----------( 338      ( 03 Jul 2023 06:05 )             45.2     07-03    142  |  108  |  9   ----------------------------<  94  3.7   |  25  |  0.93    Ca    9.8      03 Jul 2023 06:05  Phos  3.1     07-03  Mg     2.0     07-03    TPro  7.6  /  Alb  4.5  /  TBili  1.5<H>  /  DBili  x   /  AST  24  /  ALT  44  /  AlkPhos  75  07-02      Gen: NAD, resting comfortably in bed  C/V: NSR  Pulm: Nonlabored breathing, no respiratory distress  Abd: soft, NT/ND Incision: incisions are c/d/i  Extrem: WWP, no calf edema or tenderness, SCDs in place      A/P: 33M w/ recent admission (4/19-4/23/23) for perforated appendicitis determined to be possible mucocele (discharged w/ abx s/p IR drainage), now s/p lap appendectomy (7/3).    CLD/IVF  Pain + nausea control  PRN  Ctx + flagyl while inpatient   TOV @1:30AM  IS/OOBA  SCD/Holding HSQ until after POD 1 AM CBC  AM labs

## 2023-07-03 NOTE — BRIEF OPERATIVE NOTE - OPERATION/FINDINGS
Supraumbilical bj cutdown, 5mm trochar x2 placed under direct visualization. Perforated appendix identified in RLQ abscess cavity densely adherent to abdominal sidewall. Appendix mobilized off sidewall w combination of blunt dissection + Ligasure. Mesoappendix divided using electrocautery. Appendix amputated at base near cecum using EndoGIA stapler purple load 60x2, 45x1. Stump inspected laparoscopically. Specimen bag tore while extracting appendix through midline fascia, wound irrigated with betadine. Fascia closed w/ Vicryl sutures. Skin closed w 4-0 monocryl. Hemostasis achieved.

## 2023-07-03 NOTE — PROGRESS NOTE ADULT - ASSESSMENT
33/M who denies chronic medical illnesses and who was recently admitted in April 2023 for perforated appendicitis determined to be possible mucocele and treated with antibiotic and IR drainage. Per H&P, Interval CT performed on 5/10/23 showed "Complex mass containing radiodensities i.e. calcifications seen contiguous with the inferior pole of cecum. 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    Recommendations:    Right hemicolectomy planned for today  Previously found to have indirect hyperbilirubinemia, stable Tbil level from previous, no associated anemia, CTAP from June 2023 showed normal liver, GB and bile ducts; may need outpatient follow up for possible work up for conditions such as impaired bilirubin conjugation; monitor for now    Thank you for this consultation and please feel free to reach out to us with any questions.

## 2023-07-03 NOTE — PRE-ANESTHESIA EVALUATION ADULT - NSANTHPMHFT_GEN_ALL_CORE
33M w/ PMHx of recent admission (4/19-4/23/23) for perforated appendicitis determined to be possible mucocele (discharged w/ abx s/p IR drainage), no PSHx presents w/ recurrent abdominal pain since last night. He describes his pain as constant, throbbing pain that feels exactly like his previous episodes. Denies n/v. Denies f/c. Denies c/d. Denies cp/sob. Denies urinary or bowel complaints. Tolerating PO.    Interval CT performed on 5/10/23 showed "Complex mass containing radiodensities i.e. calcifications seen contiguous with the inferior pole of cecum. 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 Cannot exclude abscess."    In the ED, pt was afebrile, nontachycardic, normotensive, and satting well on RA. Labs significant for WBC 4.5. Hgb 15.2.

## 2023-07-03 NOTE — PRE-ANESTHESIA EVALUATION ADULT - NSPROPOSEDPROCEDFT_GEN_ALL_CORE
Laparoscopy possible laparotomy, appendectomy possible right hemicolectomy Patient unable to complete

## 2023-07-03 NOTE — PROGRESS NOTE ADULT - SUBJECTIVE AND OBJECTIVE BOX
SUBJECTIVE: Pt seen and examined at bedside with chief. Pt denies any complaints. Pain well controlled. Tolerated bowel prep with clear bowel movements.     MEDICATIONS  (STANDING):  heparin   Injectable 5000 Unit(s) SubCutaneous every 8 hours  lactated ringers. 1000 milliLiter(s) (125 mL/Hr) IV Continuous <Continuous>    MEDICATIONS  (PRN):  acetaminophen     Tablet .. 650 milliGRAM(s) Oral every 6 hours PRN Moderate Pain (4 - 6)  ondansetron Injectable 4 milliGRAM(s) IV Push every 6 hours PRN Nausea      Vital Signs Last 24 Hrs  T(C): 36 (03 Jul 2023 05:08), Max: 37 (02 Jul 2023 11:46)  T(F): 96.8 (03 Jul 2023 05:08), Max: 98.6 (02 Jul 2023 11:46)  HR: 66 (03 Jul 2023 05:08) (66 - 99)  BP: 101/63 (03 Jul 2023 05:08) (101/63 - 120/78)  BP(mean): --  RR: 17 (03 Jul 2023 05:08) (17 - 18)  SpO2: 98% (03 Jul 2023 05:08) (96% - 100%)    Parameters below as of 03 Jul 2023 05:08  Patient On (Oxygen Delivery Method): room air        PHYSICAL EXAM:      Constitutional: A&Ox3    Respiratory: non labored breathing, no respiratory distress    Cardiovascular: NSR, RRR    Gastrointestinal: Soft, ND, NT, no rebound or guarding    Genitourinary: Voiding     Extremities: (-) edema                  I&O's Detail    02 Jul 2023 07:01  -  03 Jul 2023 07:00  --------------------------------------------------------  IN:    Lactated Ringers: 875 mL  Total IN: 875 mL    OUT:  Total OUT: 0 mL    Total NET: 875 mL          LABS:                        15.2   5.26  )-----------( 338      ( 03 Jul 2023 06:05 )             45.2     07-03    142  |  108  |  9   ----------------------------<  94  3.7   |  25  |  0.93    Ca    9.8      03 Jul 2023 06:05  Phos  3.1     07-03  Mg     2.0     07-03    TPro  7.6  /  Alb  4.5  /  TBili  1.5<H>  /  DBili  x   /  AST  24  /  ALT  44  /  AlkPhos  75  07-02    PT/INR - ( 03 Jul 2023 06:05 )   PT: 13.1 sec;   INR: 1.10          PTT - ( 03 Jul 2023 06:05 )  PTT:37.3 sec  Urinalysis Basic - ( 03 Jul 2023 06:05 )    Color: x / Appearance: x / SG: x / pH: x  Gluc: 94 mg/dL / Ketone: x  / Bili: x / Urobili: x   Blood: x / Protein: x / Nitrite: x   Leuk Esterase: x / RBC: x / WBC x   Sq Epi: x / Non Sq Epi: x / Bacteria: x        RADIOLOGY & ADDITIONAL STUDIES:

## 2023-07-04 ENCOUNTER — TRANSCRIPTION ENCOUNTER (OUTPATIENT)
Age: 33
End: 2023-07-04

## 2023-07-04 VITALS
SYSTOLIC BLOOD PRESSURE: 118 MMHG | TEMPERATURE: 99 F | DIASTOLIC BLOOD PRESSURE: 69 MMHG | OXYGEN SATURATION: 98 % | RESPIRATION RATE: 17 BRPM | HEART RATE: 86 BPM

## 2023-07-04 LAB
ANION GAP SERPL CALC-SCNC: 11 MMOL/L — SIGNIFICANT CHANGE UP (ref 5–17)
BUN SERPL-MCNC: 8 MG/DL — SIGNIFICANT CHANGE UP (ref 7–23)
CALCIUM SERPL-MCNC: 9.2 MG/DL — SIGNIFICANT CHANGE UP (ref 8.4–10.5)
CHLORIDE SERPL-SCNC: 106 MMOL/L — SIGNIFICANT CHANGE UP (ref 96–108)
CO2 SERPL-SCNC: 20 MMOL/L — LOW (ref 22–31)
CREAT SERPL-MCNC: 0.85 MG/DL — SIGNIFICANT CHANGE UP (ref 0.5–1.3)
EGFR: 117 ML/MIN/1.73M2 — SIGNIFICANT CHANGE UP
GLUCOSE SERPL-MCNC: 99 MG/DL — SIGNIFICANT CHANGE UP (ref 70–99)
HCT VFR BLD CALC: 39.7 % — SIGNIFICANT CHANGE UP (ref 39–50)
HGB BLD-MCNC: 14 G/DL — SIGNIFICANT CHANGE UP (ref 13–17)
MAGNESIUM SERPL-MCNC: 1.8 MG/DL — SIGNIFICANT CHANGE UP (ref 1.6–2.6)
MCHC RBC-ENTMCNC: 31 PG — SIGNIFICANT CHANGE UP (ref 27–34)
MCHC RBC-ENTMCNC: 35.3 GM/DL — SIGNIFICANT CHANGE UP (ref 32–36)
MCV RBC AUTO: 87.8 FL — SIGNIFICANT CHANGE UP (ref 80–100)
NRBC # BLD: 0 /100 WBCS — SIGNIFICANT CHANGE UP (ref 0–0)
PHOSPHATE SERPL-MCNC: 4 MG/DL — SIGNIFICANT CHANGE UP (ref 2.5–4.5)
PLATELET # BLD AUTO: 321 K/UL — SIGNIFICANT CHANGE UP (ref 150–400)
POTASSIUM SERPL-MCNC: 4.4 MMOL/L — SIGNIFICANT CHANGE UP (ref 3.5–5.3)
POTASSIUM SERPL-SCNC: 4.4 MMOL/L — SIGNIFICANT CHANGE UP (ref 3.5–5.3)
RBC # BLD: 4.52 M/UL — SIGNIFICANT CHANGE UP (ref 4.2–5.8)
RBC # FLD: 12.7 % — SIGNIFICANT CHANGE UP (ref 10.3–14.5)
SODIUM SERPL-SCNC: 137 MMOL/L — SIGNIFICANT CHANGE UP (ref 135–145)
WBC # BLD: 9.83 K/UL — SIGNIFICANT CHANGE UP (ref 3.8–10.5)
WBC # FLD AUTO: 9.83 K/UL — SIGNIFICANT CHANGE UP (ref 3.8–10.5)

## 2023-07-04 PROCEDURE — 85610 PROTHROMBIN TIME: CPT

## 2023-07-04 PROCEDURE — 36415 COLL VENOUS BLD VENIPUNCTURE: CPT

## 2023-07-04 PROCEDURE — 99285 EMERGENCY DEPT VISIT HI MDM: CPT | Mod: 25

## 2023-07-04 PROCEDURE — 96374 THER/PROPH/DIAG INJ IV PUSH: CPT

## 2023-07-04 PROCEDURE — 86850 RBC ANTIBODY SCREEN: CPT

## 2023-07-04 PROCEDURE — 85027 COMPLETE CBC AUTOMATED: CPT

## 2023-07-04 PROCEDURE — C1889: CPT

## 2023-07-04 PROCEDURE — 80048 BASIC METABOLIC PNL TOTAL CA: CPT

## 2023-07-04 PROCEDURE — 85025 COMPLETE CBC W/AUTO DIFF WBC: CPT

## 2023-07-04 PROCEDURE — 88304 TISSUE EXAM BY PATHOLOGIST: CPT

## 2023-07-04 PROCEDURE — 85730 THROMBOPLASTIN TIME PARTIAL: CPT

## 2023-07-04 PROCEDURE — 80053 COMPREHEN METABOLIC PANEL: CPT

## 2023-07-04 PROCEDURE — 83735 ASSAY OF MAGNESIUM: CPT

## 2023-07-04 PROCEDURE — 86900 BLOOD TYPING SEROLOGIC ABO: CPT

## 2023-07-04 PROCEDURE — 86901 BLOOD TYPING SEROLOGIC RH(D): CPT

## 2023-07-04 PROCEDURE — 84100 ASSAY OF PHOSPHORUS: CPT

## 2023-07-04 RX ORDER — CEFPODOXIME PROXETIL 100 MG
1 TABLET ORAL
Qty: 14 | Refills: 0
Start: 2023-07-04 | End: 2023-07-10

## 2023-07-04 RX ORDER — OXYCODONE HYDROCHLORIDE 5 MG/1
1 TABLET ORAL
Qty: 12 | Refills: 0
Start: 2023-07-04 | End: 2023-07-06

## 2023-07-04 RX ORDER — DOCUSATE SODIUM 100 MG
1 CAPSULE ORAL
Qty: 14 | Refills: 0
Start: 2023-07-04 | End: 2023-07-10

## 2023-07-04 RX ORDER — METRONIDAZOLE 500 MG
1 TABLET ORAL
Qty: 21 | Refills: 0
Start: 2023-07-04 | End: 2023-07-10

## 2023-07-04 RX ORDER — HEPARIN SODIUM 5000 [USP'U]/ML
5000 INJECTION INTRAVENOUS; SUBCUTANEOUS EVERY 8 HOURS
Refills: 0 | Status: DISCONTINUED | OUTPATIENT
Start: 2023-07-04 | End: 2023-07-04

## 2023-07-04 RX ORDER — MAGNESIUM SULFATE 500 MG/ML
1 VIAL (ML) INJECTION ONCE
Refills: 0 | Status: COMPLETED | OUTPATIENT
Start: 2023-07-04 | End: 2023-07-04

## 2023-07-04 RX ADMIN — Medication 650 MILLIGRAM(S): at 06:10

## 2023-07-04 RX ADMIN — Medication 100 MILLIGRAM(S): at 13:55

## 2023-07-04 RX ADMIN — Medication 650 MILLIGRAM(S): at 05:10

## 2023-07-04 RX ADMIN — Medication 100 MILLIGRAM(S): at 05:10

## 2023-07-04 RX ADMIN — Medication 650 MILLIGRAM(S): at 12:56

## 2023-07-04 RX ADMIN — Medication 100 GRAM(S): at 10:27

## 2023-07-04 NOTE — DISCHARGE NOTE NURSING/CASE MANAGEMENT/SOCIAL WORK - PATIENT PORTAL LINK FT
You can access the FollowMyHealth Patient Portal offered by [facility] by registering at the following website: [email or web address]. By joining [facility]’s FollowMyHealth portal, you will also be able to view your health information using other applications (apps) compatible with our system.

## 2023-07-04 NOTE — DISCHARGE NOTE PROVIDER - NSDCMRMEDTOKEN_GEN_ALL_CORE_FT
cefpodoxime 200 mg oral tablet: 1 tab(s) orally every 12 hours  docusate sodium 100 mg oral tablet: 1 tab(s) orally every 12 hours as needed for  constipation  metroNIDAZOLE 500 mg oral tablet: 1 tab(s) orally every 8 hours  oxyCODONE 5 mg oral tablet: 1 tab(s) orally every 6 hours as needed for  severe pain MDD: 4 tablets   cefpodoxime 200 mg oral tablet: 1 tab(s) orally every 12 hours  docusate sodium 100 mg oral tablet: 1 tab(s) orally every 12 hours as needed for  constipation  metroNIDAZOLE 500 mg oral tablet: 1 tab(s) orally every 8 hours  oxyCODONE 5 mg oral tablet: 1 tab(s) orally every 6 hours as needed for  severe pain MDD: 4

## 2023-07-04 NOTE — DISCHARGE NOTE PROVIDER - CARE PROVIDER_API CALL
Leonard Flores  Colon/Rectal Surgery  [address], [address]  [place], [place] [facility]  Phone: [phone]  Fax: [phone]  Follow Up Time:    admitted for GI bleed

## 2023-07-04 NOTE — DISCHARGE NOTE PROVIDER - NSDCFUADDINST_GEN_ALL_CORE_FT
Follow up with Dr. Flores in 1 week. Call the office at [phone] below to schedule your appointment. You may shower; soap and water over incision sites. Do not scrub. Pat dry when done. No tub bathing or swimming until cleared. Keep incision sites out of the sun as scars will darken. Ambulate as tolerated, but no heavy lifting (>10lbs) or strenuous exercise. You may resume regular diet. You should be urinating at least 3-4x per day. Call the office if you experience increasing abdominal pain, nausea, vomiting, or temperature >101 F.    Warning Signs:  Please call your doctor or nurse practitioner if you experience the following:  *You experience new chest pain, pressure, squeezing or tightness.  *New or worsening cough, shortness of breath, or wheeze.  *If you are vomiting and cannot keep down fluids or your medications.  *You are getting dehydrated due to continued vomiting, diarrhea, or other reasons. Signs of dehydration include dry mouth, rapid heartbeat, or feeling dizzy or faint when standing.  *You see blood or dark/black material when you vomit or have a bowel movement.  *You experience burning when you urinate, have blood in your urine, or experience a discharge.  *Your pain is not improving within 8-12 hours or is not gone within 24 hours. Call or return immediately if your pain is getting worse, changes location, or moves to your chest or back.  *You have shaking chills, or fever greater than 101.5 degrees Fahrenheit or 38 degrees Celsius.  *Any change in your symptoms, or any new symptoms that concern you.    New medications:   Cefpodoxime (as instructed by pharmacy)  Flagyl (as instructed by pharmacy)  Oxycodone (as instructed by pharmacy) and you may take over-the-counter Tylenol 1000mg every 6 hours as needed for moderate to severe pain. Do not exceed 4000mg of Tylenol in 24 hour period.   Colace 100 BID

## 2023-07-04 NOTE — PROGRESS NOTE ADULT - SUBJECTIVE AND OBJECTIVE BOX
SUBJECTIVE:  TYRONE. Pt seen on rounds this AM. Pt pain well controlled under current regimen. -n/-v, -BM, -F    MEDICATIONS  (STANDING):  acetaminophen     Tablet .. 650 milliGRAM(s) Oral every 6 hours  cefTRIAXone   IVPB 1000 milliGRAM(s) IV Intermittent every 24 hours  cefTRIAXone   IVPB      lactated ringers. 1000 milliLiter(s) (75 mL/Hr) IV Continuous <Continuous>  metroNIDAZOLE  IVPB 500 milliGRAM(s) IV Intermittent every 8 hours  metroNIDAZOLE  IVPB        MEDICATIONS  (PRN):  HYDROmorphone  Injectable 0.5 milliGRAM(s) IV Push once PRN Severe Pain (7 - 10)  ondansetron Injectable 4 milliGRAM(s) IV Push every 6 hours PRN Nausea  oxyCODONE    IR 5 milliGRAM(s) Oral every 8 hours PRN Mild Pain (1 - 3)  oxyCODONE    IR 10 milliGRAM(s) Oral every 8 hours PRN Moderate Pain (4 - 6)      Vital Signs Last 24 Hrs  T(C): 36.6 (04 Jul 2023 05:06), Max: 37.2 (03 Jul 2023 20:08)  T(F): 97.8 (04 Jul 2023 05:06), Max: 98.9 (03 Jul 2023 20:08)  HR: 61 (04 Jul 2023 05:06) (61 - 97)  BP: 109/66 (04 Jul 2023 05:06) (102/62 - 124/65)  BP(mean): 79 (03 Jul 2023 19:10) (75 - 87)  RR: 18 (04 Jul 2023 05:06) (16 - 22)  SpO2: 95% (04 Jul 2023 05:06) (94% - 100%)    Parameters below as of 04 Jul 2023 05:06  Patient On (Oxygen Delivery Method): room air        Physical Exam:  General: NAD, resting comfortably in bed  Pulmonary: Nonlabored breathing, no respiratory distress  Cardiovascular: NSR  Abdominal: soft, NT/ND  Extremities: WWP, normal strength  Neuro: A/O x 3, CNs II-XII grossly intact, no focal deficits    I&O's Summary    03 Jul 2023 07:01  -  04 Jul 2023 07:00  --------------------------------------------------------  IN: 3000 mL / OUT: 1400 mL / NET: 1600 mL    04 Jul 2023 07:01  -  04 Jul 2023 08:02  --------------------------------------------------------  IN: 75 mL / OUT: 375 mL / NET: -300 mL        LABS:                        15.2   5.26  )-----------( 338      ( 03 Jul 2023 06:05 )             45.2     07-03    142  |  108  |  9   ----------------------------<  94  3.7   |  25  |  0.93    Ca    9.8      03 Jul 2023 06:05  Phos  3.1     07-03  Mg     2.0     07-03    TPro  7.6  /  Alb  4.5  /  TBili  1.5<H>  /  DBili  x   /  AST  24  /  ALT  44  /  AlkPhos  75  07-02    PT/INR - ( 03 Jul 2023 06:05 )   PT: 13.1 sec;   INR: 1.10          PTT - ( 03 Jul 2023 06:05 )  PTT:37.3 sec  Urinalysis Basic - ( 03 Jul 2023 06:05 )    Color: x / Appearance: x / SG: x / pH: x  Gluc: 94 mg/dL / Ketone: x  / Bili: x / Urobili: x   Blood: x / Protein: x / Nitrite: x   Leuk Esterase: x / RBC: x / WBC x   Sq Epi: x / Non Sq Epi: x / Bacteria: x      CAPILLARY BLOOD GLUCOSE        LIVER FUNCTIONS - ( 02 Jul 2023 11:09 )  Alb: 4.5 g/dL / Pro: 7.6 g/dL / ALK PHOS: 75 U/L / ALT: 44 U/L / AST: 24 U/L / GGT: x             RADIOLOGY & ADDITIONAL STUDIES:

## 2023-07-04 NOTE — DISCHARGE NOTE PROVIDER - HOSPITAL COURSE
33M w/ recent admission (4/19-4/23/23) for perforated appendicitis determined to be possible mucocele (discharged w/ abx s/p IR drainage) now s/p lap appendectomy (7/3). Pt hospital course uncomplicated. Diet advanced as tolerated. At time of discharge pt is tolerating diet, pain is well controlled. Pt is hemodynamically stable and ready for discharge home.

## 2023-07-04 NOTE — PROGRESS NOTE ADULT - ASSESSMENT
33M w/ recent admission (4/19-4/23/23) for perforated appendicitis determined to be possible mucocele (discharged w/ abx s/p IR drainage) now s/p lap appendectomy (7/3).    CLD/IVF  Pain + nausea control  PRN  Ctx + flagyl while inpatient - to be transitioned to PO abx upon discharge   IS/OOBA  SCD  SQH holding until AM CBC 33M w/ recent admission (4/19-4/23/23) for perforated appendicitis determined to be possible mucocele (discharged w/ abx s/p IR drainage) now s/p lap appendectomy (7/3).    HLIVF  regular diet, advance as tolerated   Pain + nausea control  PRN  Ctx + flagyl while inpatient - to be transitioned to 7-day course of cefepodoxime 200mg BID PO, Flagyl 500mg TID PO   IS/OOBA  SCD  SQH @7pm   possible dc today

## 2023-07-04 NOTE — PROGRESS NOTE ADULT - ASSESSMENT
33/M who denies chronic medical illnesses and who was recently admitted in April 2023 for perforated appendicitis determined to be possible mucocele and treated with antibiotic and IR drainage. Per H&P, Interval CT performed on 5/10/23 showed "Complex mass containing radiodensities i.e. calcifications seen contiguous with the inferior pole of cecum. 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    Recommendations:    Right hemicolectomy 7/3 - doing well today   Previously found to have indirect hyperbilirubinemia, stable Tbil level from previous, no associated anemia, CTAP from June 2023 showed normal liver, GB and bile ducts; may need outpatient follow up for possible work up for conditions such as impaired bilirubin conjugation; discharge planning today     Thank you for this consultation and please feel free to reach out to us with any questions.

## 2023-07-04 NOTE — PROGRESS NOTE ADULT - SUBJECTIVE AND OBJECTIVE BOX
32 yo M who denies chronic medical illnesses and who was recently admitted in April 2023 for perforated appendicitis determined to be possible mucocele and treated with antibiotic and IR drainage. He has been well since discharge until 1 day prior to admission when he started to develop right lower quadrant constant pain with nausea that progressively got worse therefore he presented to the ED.     patient seen and examined at bedside  gentleman doing well, abdominal pain controlled   s/p hemicolectomy in good spirits   diet today possible discharge planning     Review of Systems:  The rest of the 14 systems reviewed and found negative except per HPI.    PAST MEDICAL & SURGICAL HISTORY:  Perforated appendicitis    PERSONAL SOCIAL HISTORY:  Denies smoking  Occasional alcohol use  Denies illicit drug use    FAMILY HISTORY:  Denies premature atherosclerosis in the family  Denies cancer in the family    HOME MEDICATIONS:  None    MEDICATIONS  (STANDING):  heparin   Injectable 5000 Unit(s) SubCutaneous every 8 hours  lactated ringers. 1000 milliLiter(s) (125 mL/Hr) IV Continuous <Continuous>    MEDICATIONS  (PRN):  acetaminophen     Tablet .. 650 milliGRAM(s) Oral every 6 hours PRN Moderate Pain (4 - 6)  ondansetron Injectable 4 milliGRAM(s) IV Push every 6 hours PRN Nausea      T(C): 36.6 (07-03-23 @ 09:30), Max: 36.8 (07-02-23 @ 20:51)  HR: 68 (07-03-23 @ 09:30) (66 - 82)  BP: 102/62 (07-03-23 @ 09:30) (101/63 - 119/73)  RR: 18 (07-03-23 @ 09:30) (17 - 18)  SpO2: 99% (07-03-23 @ 09:30) (96% - 99%)  Wt(kg): --Vital Signs Last 24 Hrs  T(C): 36.6 (03 Jul 2023 09:30), Max: 36.8 (02 Jul 2023 20:51)  T(F): 97.8 (03 Jul 2023 09:30), Max: 98.2 (02 Jul 2023 20:51)  HR: 68 (03 Jul 2023 09:30) (66 - 82)  BP: 102/62 (03 Jul 2023 09:30) (101/63 - 119/73)  BP(mean): 75 (03 Jul 2023 09:30) (75 - 75)  RR: 18 (03 Jul 2023 09:30) (17 - 18)  SpO2: 99% (03 Jul 2023 09:30) (96% - 99%)    Parameters below as of 03 Jul 2023 09:30  Patient On (Oxygen Delivery Method): room air      Oxygen Saturation Index= Unable to calculate   [Based on FiO2 = Unknown, SpO2 = 99(07/03/2023 09:30), MAP = Unknown]  Daily Height in cm: 175.3 (02 Jul 2023 20:02)    Daily     PHYSICAL EXAM:  GENERAL: NAD  CHEST/LUNG: Clear to percussion bilaterally  HEART: Regular rate and rhythm; S1 and S2 heard  ABDOMEN: Soft, (+) direct RLQ tenderness without guarding, Nondistended; Bowel sounds present  EXTREMITIES:  2+ Peripheral Pulses, No clubbing, cyanosis, or edema    CAPILLARY BLOOD GLUCOSE    LABS:  CBC   07-03-23 @ 06:05  Hematcorit 45.2  Hemoglobin 15.2  Mean Cell Hemoglobin 30.6  Platelet Count-Automated 338  RBC Count 4.97  Red Cell Distrib Width 13.0  Wbc Count 5.26    BMP  07-03-23 @ 06:05  Anion Gap. Serum 9  Blood Urea Nitrogen,Serm 9  Calcium, Total Serum 9.8  Carbon Dioxide, Serum 25  Chloride, Serum 108  Creatinine, Serum 0.93  eGFR in  --  eGFR in Non Afican American --  Gloucose, serum 94  Potassium, Serum 3.7  Sodium, Serum 142    07-02-23 @ 11:09  Anion Gap. Serum 8  Blood Urea Nitrogen,Serm 12  Calcium, Total Serum 9.7  Carbon Dioxide, Serum 27  Chloride, Serum 105  Creatinine, Serum 0.90  eGFR in  --  eGFR in Non Afican American --  Gloucose, serum 103  Potassium, Serum 4.5  Sodium, Serum 140    CMP  07-03-23 @ 06:05  Paula Aminotransferase(ALT/SGPT)--  Albumin, Serum --  Alkaline Phosphatase, Serum --  Anion Gap, Serum 9  Aspartate Aminotransferase (AST/SGOT)--  Bilirubin Total, Serum --  Blood Urea Nitrogen, Serum 9  Calcium,Total Serum 9.8  Carbon Dioxide, Serum 25  Chloride, Serum 108  Creatinine, Serum 0.93  eGFR if  --  eGFR if Non African American --  Glucose, Serum 94  Potassium, Serum 3.7  Protein Total, Serum --  Sodium, Serum 142    07-02-23 @ 11:09  Paula Aminotransferase(ALT/SGPT)44  Albumin, Serum 4.5  Alkaline Phosphatase, Serum 75  Anion Gap, Serum 8  Aspartate Aminotransferase (AST/SGOT)24  Bilirubin Total, Serum 1.5  Blood Urea Nitrogen, Serum 12  Calcium,Total Serum 9.7  Carbon Dioxide, Serum 27  Chloride, Serum 105  Creatinine, Serum 0.90  eGFR if  --  eGFR if Non African American --  Glucose, Serum 103  Potassium, Serum 4.5  Protein Total, Serum 7.6  Sodium, Serum 140    PT/INR  PT/INR  07-03-23 @ 06:05  INR 1.10  Prothrombin Time Comment --  Prothrobin Time, [id number]  PT/INR  07-02-23 @ 11:09  INR 1.09  Prothrombin Time Comment --  Prothrobin Time, Enkrco50.0   32 yo M who denies chronic medical illnesses and who was recently admitted in April 2023 for perforated appendicitis determined to be possible mucocele and treated with antibiotic and IR drainage. He has been well since discharge until 1 day prior to admission when he started to develop right lower quadrant constant pain with nausea that progressively got worse therefore he presented to the ED.     patient seen and examined at bedside  gentleman doing well, abdominal pain controlled   s/p hemicolectomy in good spirits   diet today possible discharge planning     Review of Systems:  The rest of the 14 systems reviewed and found negative except per HPI.    PAST MEDICAL & SURGICAL HISTORY:  Perforated appendicitis    PERSONAL SOCIAL HISTORY:  Denies smoking  Occasional alcohol use  Denies illicit drug use    FAMILY HISTORY:  Denies premature atherosclerosis in the family  Denies cancer in the family    HOME MEDICATIONS:  None  Vital Signs Last 24 Hrs  T(C): 37.1 (04 Jul 2023 08:45), Max: 37.2 (03 Jul 2023 20:08)  T(F): 98.8 (04 Jul 2023 08:45), Max: 98.9 (03 Jul 2023 20:08)  HR: 85 (04 Jul 2023 08:45) (61 - 97)  BP: 110/66 (04 Jul 2023 08:45) (102/62 - 124/65)  BP(mean): 79 (03 Jul 2023 19:10) (75 - 87)  RR: 17 (04 Jul 2023 08:45) (16 - 22)  SpO2: 98% (04 Jul 2023 08:45) (94% - 100%)    Parameters below as of 04 Jul 2023 08:45  Patient On (Oxygen Delivery Method): room air      MEDICATIONS  (STANDING):  acetaminophen     Tablet .. 650 milliGRAM(s) Oral every 6 hours  cefTRIAXone   IVPB 1000 milliGRAM(s) IV Intermittent every 24 hours  cefTRIAXone   IVPB      heparin   Injectable 5000 Unit(s) SubCutaneous every 8 hours  metroNIDAZOLE  IVPB      metroNIDAZOLE  IVPB 500 milliGRAM(s) IV Intermittent every 8 hours    MEDICATIONS  (PRN):  HYDROmorphone  Injectable 0.5 milliGRAM(s) IV Push once PRN Severe Pain (7 - 10)  ondansetron Injectable 4 milliGRAM(s) IV Push every 6 hours PRN Nausea  oxyCODONE    IR 10 milliGRAM(s) Oral every 8 hours PRN Moderate Pain (4 - 6)  oxyCODONE    IR 5 milliGRAM(s) Oral every 8 hours PRN Mild Pain (1 - 3)        Oxygen Saturation Index= Unable to calculate   [Based on FiO2 = Unknown, SpO2 = 99(07/03/2023 09:30), MAP = Unknown]  Daily Height in cm: 175.3 (02 Jul 2023 20:02)    Daily     PHYSICAL EXAM:  GENERAL: NAD  CHEST/LUNG: Clear to percussion bilaterally  HEART: Regular rate and rhythm; S1 and S2 heard  ABDOMEN: Soft, (+) mild tenderness diffusely more on RLQ, laparoscopic incisions healing well, Nondistended; Bowel sounds present  EXTREMITIES:  2+ Peripheral Pulses, No clubbing, cyanosis, or edema    CAPILLARY BLOOD GLUCOSE    LABS:  CBC   07-03-23 @ 06:05  Hematcorit 45.2  Hemoglobin 15.2  Mean Cell Hemoglobin 30.6  Platelet Count-Automated 338  RBC Count 4.97  Red Cell Distrib Width 13.0  Wbc Count 5.26    BMP  07-03-23 @ 06:05  Anion Gap. Serum 9  Blood Urea Nitrogen,Serm 9  Calcium, Total Serum 9.8  Carbon Dioxide, Serum 25  Chloride, Serum 108  Creatinine, Serum 0.93  eGFR in  --  eGFR in Non Afican American --  Gloucose, serum 94  Potassium, Serum 3.7  Sodium, Serum 142    07-02-23 @ 11:09  Anion Gap. Serum 8  Blood Urea Nitrogen,Serm 12  Calcium, Total Serum 9.7  Carbon Dioxide, Serum 27  Chloride, Serum 105  Creatinine, Serum 0.90  eGFR in  --  eGFR in Non Afican American --  Gloucose, serum 103  Potassium, Serum 4.5  Sodium, Serum 140    CMP  07-03-23 @ 06:05  Paula Aminotransferase(ALT/SGPT)--  Albumin, Serum --  Alkaline Phosphatase, Serum --  Anion Gap, Serum 9  Aspartate Aminotransferase (AST/SGOT)--  Bilirubin Total, Serum --  Blood Urea Nitrogen, Serum 9  Calcium,Total Serum 9.8  Carbon Dioxide, Serum 25  Chloride, Serum 108  Creatinine, Serum 0.93  eGFR if  --  eGFR if Non African American --  Glucose, Serum 94  Potassium, Serum 3.7  Protein Total, Serum --  Sodium, Serum 142    07-02-23 @ 11:09  Paula Aminotransferase(ALT/SGPT)44  Albumin, Serum 4.5  Alkaline Phosphatase, Serum 75  Anion Gap, Serum 8  Aspartate Aminotransferase (AST/SGOT)24  Bilirubin Total, Serum 1.5  Blood Urea Nitrogen, Serum 12  Calcium,Total Serum 9.7  Carbon Dioxide, Serum 27  Chloride, Serum 105  Creatinine, Serum 0.90  eGFR if  --  eGFR if Non African American --  Glucose, Serum 103  Potassium, Serum 4.5  Protein Total, Serum 7.6  Sodium, Serum 140    PT/INR  PT/INR  07-03-23 @ 06:05  INR 1.10  Prothrombin Time Comment --  Prothrobin Time, [id number]  PT/INR  07-02-23 @ 11:09  INR 1.09  Prothrombin Time Comment --  Prothrobin Time, [id number]

## 2023-07-13 LAB — SURGICAL PATHOLOGY STUDY: SIGNIFICANT CHANGE UP

## 2023-07-24 NOTE — H&P ADULT - NSICDXPASTMEDICALHX_GEN_ALL_CORE_FT
NOTIFICATION OF ADMISSION DISCHARGE   This is a Notification of Discharge from [address] [place] [address]. Please be advised that this patient has been discharge from our facility. Below you will find the admission and discharge date and time including the patient’s disposition. UTILIZATION REVIEW CONTACT:  Niurka Bunn  Utilization   Network Utilization Review Department  Phone: [phone] x carefully listen to the prompts. All voicemails are confidential.  Email: [email or web address]     ADMISSION INFORMATION  PRESENTATION DATE: 7/19/2023  2:25 AM  OBERVATION ADMISSION DATE:   INPATIENT ADMISSION DATE: 7/19/23  4:19 AM   DISCHARGE DATE: 7/21/2023  4:07 PM   DISPOSITION:Home/Self Care    IMPORTANT INFORMATION:  Send all requests for admission clinical reviews, approved or denied determinations and any other requests to dedicated fax number below belonging to the campus where the patient is receiving treatment.  List of dedicated fax numbers:  [place] DENIALS (Administrative/Medical Necessity) [phone] [address] [facility] (Maternity/NICU/Pediatrics) [phone]   [facility] [phone]   [facility] [phone] 16[address] [facility] [phone]   [address] [place] [address] [phone]   [facility] [phone]   [address] [place] [address] 608 [facility] [phone]   [address] [place] [address] [phone] 3441 [facility] [phone] 2720 [facility] 3000 32[facility] [phone] PAST MEDICAL HISTORY:  Perforated appendicitis

## (undated) DEVICE — TROCAR ETHICON ENDOPATH XCEL BLADELESS 12MM X 100MM STABILITY

## (undated) DEVICE — TUBING STRYKER PNEUMOSURE HI FLOW INSUFFLATOR

## (undated) DEVICE — SOL IRR BAG NS 0.9% 3000ML

## (undated) DEVICE — Device

## (undated) DEVICE — WARMING BLANKET LOWER ADULT

## (undated) DEVICE — POSITIONER FOAM EGG CRATE ULNAR 2PCS (PINK)

## (undated) DEVICE — GOWN ROYAL SILK XL

## (undated) DEVICE — WARMING BLANKET UPPER ADULT

## (undated) DEVICE — STAPLER COVIDIEN ENDO GIA STANDARD HANDLE

## (undated) DEVICE — MARKING PEN W RULER

## (undated) DEVICE — TROCAR ETHICON ENDOPATH XCEL BLADELESS 5MM X 100MM STABILITY

## (undated) DEVICE — DRAPE TOWEL BLUE 17" X 24"

## (undated) DEVICE — GLV 8 PROTEXIS (WHITE)

## (undated) DEVICE — TIP METZENBAUM SCISSOR MONOPOLAR ENDOCUT (ORANGE)

## (undated) DEVICE — DRSG DERMABOND 0.7ML

## (undated) DEVICE — LIGASURE MARYLAND 37CM

## (undated) DEVICE — TROCAR COVIDIEN VERSAPORT BLADELESS OPTICAL 5MM STANDARD

## (undated) DEVICE — PACK GENERAL LAPAROSCOPY

## (undated) DEVICE — TROCAR COVIDIEN BLUNT TIP HASSAN 12MM

## (undated) DEVICE — SUT MONOCRYL 4-0 18" PS-2

## (undated) DEVICE — VENODYNE/SCD SLEEVE CALF MEDIUM

## (undated) DEVICE — SUT VICRYL 0 27" UR-6

## (undated) DEVICE — TROCAR COVIDIEN VERSAONE FIXATION CANNULA 5MM

## (undated) DEVICE — ENDOCATCH 10MM SPECIMEN POUCH